# Patient Record
Sex: FEMALE | Race: BLACK OR AFRICAN AMERICAN | NOT HISPANIC OR LATINO | Employment: FULL TIME | URBAN - METROPOLITAN AREA
[De-identification: names, ages, dates, MRNs, and addresses within clinical notes are randomized per-mention and may not be internally consistent; named-entity substitution may affect disease eponyms.]

---

## 2017-06-04 ENCOUNTER — HOSPITAL ENCOUNTER (EMERGENCY)
Facility: HOSPITAL | Age: 52
Discharge: HOME | End: 2017-06-04
Payer: MEDICAID

## 2017-06-04 VITALS
BODY MASS INDEX: 37.22 KG/M2 | WEIGHT: 218 LBS | SYSTOLIC BLOOD PRESSURE: 186 MMHG | OXYGEN SATURATION: 100 % | TEMPERATURE: 98 F | HEART RATE: 77 BPM | RESPIRATION RATE: 18 BRPM | DIASTOLIC BLOOD PRESSURE: 100 MMHG | HEIGHT: 64 IN

## 2017-06-04 DIAGNOSIS — Z51.89 ABSCESS RE-CHECK: Primary | ICD-10-CM

## 2017-06-04 PROCEDURE — 99283 EMERGENCY DEPT VISIT LOW MDM: CPT

## 2017-06-04 RX ORDER — SULFAMETHOXAZOLE AND TRIMETHOPRIM 800; 160 MG/1; MG/1
1 TABLET ORAL 2 TIMES DAILY
Qty: 20 TABLET | Refills: 0 | Status: SHIPPED | OUTPATIENT
Start: 2017-06-04 | End: 2017-06-14

## 2017-06-04 RX ORDER — DICLOFENAC SODIUM 75 MG/1
75 TABLET, DELAYED RELEASE ORAL 2 TIMES DAILY
Qty: 20 TABLET | Refills: 0 | Status: SHIPPED | OUTPATIENT
Start: 2017-06-04

## 2017-06-04 NOTE — ED PROVIDER NOTES
Encounter Date: 6/4/2017       History     Chief Complaint   Patient presents with    Abscess     R buttocks, drained two weeks ago urgent care     Review of patient's allergies indicates:  No Known Allergies  The history is provided by the patient.   Wound Check    She was treated in the ED 10 to 14 days ago. Previous treatment in the ED includes oral antibiotics and I&D of abscess. Treatments since wound repair include oral antibiotics, a wound recheck and regular peroxide and water cleansings. There has been colored discharge from the wound. The redness has improved. The swelling has improved. The pain has improved.     History reviewed. No pertinent past medical history.  No past surgical history on file.  History reviewed. No pertinent family history.  Social History   Substance Use Topics    Smoking status: Never Smoker    Smokeless tobacco: Not on file    Alcohol use No     Review of Systems   Constitutional: Negative for chills and fever.   HENT: Negative for sore throat.    Eyes: Negative for photophobia and redness.   Respiratory: Negative for cough and shortness of breath.    Cardiovascular: Negative for chest pain.   Gastrointestinal: Negative for abdominal pain, diarrhea and nausea.   Endocrine: Negative for polydipsia and polyphagia.   Genitourinary: Negative for dysuria.   Musculoskeletal: Negative for arthralgias, back pain and myalgias.   Skin: Positive for wound. Negative for rash.   Neurological: Negative for weakness and headaches.   Hematological: Does not bruise/bleed easily.   Psychiatric/Behavioral: The patient is not nervous/anxious.    All other systems reviewed and are negative.      Physical Exam     Initial Vitals [06/04/17 1137]   BP Pulse Resp Temp SpO2   (!) 203/90 78 18 98 °F (36.7 °C) 98 %     Physical Exam    Nursing note and vitals reviewed.  Constitutional: Vital signs are normal. She appears well-developed and well-nourished. No distress.   HENT:   Head: Normocephalic and  atraumatic.   Right Ear: External ear normal.   Left Ear: External ear normal.   Nose: Nose normal.   Mouth/Throat: Oropharynx is clear and moist.   Eyes: Conjunctivae, EOM and lids are normal. Pupils are equal, round, and reactive to light.   Neck: Normal range of motion and full passive range of motion without pain. Neck supple.   Cardiovascular: Normal rate, regular rhythm, S1 normal, S2 normal, normal heart sounds, intact distal pulses and normal pulses.   Pulmonary/Chest: Breath sounds normal. No respiratory distress. She has no wheezes. She has no rales.   Abdominal: Soft. Normal appearance and bowel sounds are normal. She exhibits no distension. There is no tenderness.   Musculoskeletal: Normal range of motion.   Lymphadenopathy:     She has no cervical adenopathy.   Neurological: She is alert and oriented to person, place, and time. She has normal strength. No cranial nerve deficit or sensory deficit. Coordination and gait normal.   Skin: Skin is warm, dry and intact.   Healing abscess to right buttock;  No erythema, swelling, induration or fluctuance noted;  Minimal purulent drainage noted from incision site.   Psychiatric: She has a normal mood and affect. Her speech is normal and behavior is normal. Judgment and thought content normal. Cognition and memory are normal.         ED Course   Procedures  Labs Reviewed - No data to display          Medical Decision Making:   ED Management:  Packing removed from right buttock abscess and repacked with 1 in iodoform gauze                   ED Course     Clinical Impression:   The encounter diagnosis was Abscess re-check.    Disposition:   Disposition: Discharged  Condition: Stable       LAYA Plunkett  06/04/17 1155       LAYA Plunkett  06/04/17 1156

## 2017-06-06 ENCOUNTER — HOSPITAL ENCOUNTER (EMERGENCY)
Facility: HOSPITAL | Age: 52
Discharge: HOME | End: 2017-06-06
Payer: MEDICAID

## 2017-06-06 VITALS
RESPIRATION RATE: 18 BRPM | HEART RATE: 90 BPM | OXYGEN SATURATION: 100 % | DIASTOLIC BLOOD PRESSURE: 82 MMHG | HEIGHT: 64 IN | BODY MASS INDEX: 37.22 KG/M2 | WEIGHT: 218 LBS | TEMPERATURE: 97 F | SYSTOLIC BLOOD PRESSURE: 163 MMHG

## 2017-06-06 DIAGNOSIS — Z51.89 WOUND CHECK, ABSCESS: Primary | ICD-10-CM

## 2017-06-06 PROCEDURE — 99281 EMR DPT VST MAYX REQ PHY/QHP: CPT

## 2017-06-06 NOTE — ED PROVIDER NOTES
"SCRIBE #1 NOTE: I, Bobbi Mackey, am scribing for, and in the presence of, LAYA Garcia. I have scribed the entire note.      History      Chief Complaint   Patient presents with    Wound Check     " need the packing removed"       Review of patient's allergies indicates:  No Known Allergies     HPI   HPI    6/6/2017, 3:11 PM   History obtained from the patient      History of Present Illness: Shi Roe is a 51 y.o. female patient with I&D of R buttock abscess who presents to the Emergency Department for packing removal from the wound. She presented here 2 days ago, had packing removed, and then had wound repacked. She states that she is here today to have the packing removed. She has no additional complaints. No exacerbating factors reported. Patient denies fever, chills, N/V/D, worsening pain/drainage from wound, and all other sxs at this time. No further complaints or concerns at this time.     Arrival mode: Personal vehicle    PCP: Primary Doctor No       Past Medical History:  Past Medical History:   Diagnosis Date    Hypertension        Past Surgical History:  Past Surgical History:   Procedure Laterality Date    TUBAL LIGATION           Family History:  Family History   Problem Relation Age of Onset    Kidney disease Mother     Heart disease Father     Cancer Sister     Multiple sclerosis Sister        Social History:  Social History     Social History Main Topics    Smoking status: Current Every Day Smoker     Packs/day: 0.50    Smokeless tobacco: Unknown    Alcohol use No    Drug use: No    Sexual activity: Unknown       ROS   Review of Systems   Constitutional: Negative for chills and fever.   HENT: Negative for sore throat.    Respiratory: Negative for shortness of breath.    Cardiovascular: Negative for chest pain.   Gastrointestinal: Negative for diarrhea, nausea and vomiting.   Genitourinary: Negative for dysuria.   Musculoskeletal: Negative for back pain.   Skin: Positive for " "wound (abscess). Negative for rash.   Neurological: Negative for weakness.   Hematological: Does not bruise/bleed easily.   All other systems reviewed and are negative.      Physical Exam      Initial Vitals [06/06/17 1341]   BP Pulse Resp Temp SpO2   (!) 163/82 90 18 97.4 °F (36.3 °C) 100 %      Physical Exam  Nursing Notes and Vital Signs Reviewed.  Constitutional: Patient is in no acute distress. Awake and alert. Well-developed and well-nourished.  Head: Atraumatic. Normocephalic.  Eyes: PERRL. EOM intact. Conjunctivae are not pale. No scleral icterus.  ENT: Mucous membranes are moist. Oropharynx is clear and symmetric.    Neck: Supple. Full ROM. No lymphadenopathy.  Cardiovascular: Regular rate. Regular rhythm. No murmurs, rubs, or gallops.   Pulmonary/Chest: No respiratory distress. Clear to auscultation bilaterally. No wheezing, rales, or rhonchi.  Abdominal: Non-distended. Soft.  Musculoskeletal: Moves all extremities. No obvious deformities.  Skin: Warm and dry. Packing and bandage removed from wound to R buttock. No pus or drainage visible. Minimal drainage on the bandage.   Neurological:  Alert, awake, and appropriate.  Normal speech.  No acute focal neurological deficits are appreciated.  Psychiatric: Normal affect. Good eye contact. Appropriate in content.    ED Course    Procedures  ED Vital Signs:  Vitals:    06/06/17 1341   BP: (!) 163/82   Pulse: 90   Resp: 18   Temp: 97.4 °F (36.3 °C)   TempSrc: Tympanic   SpO2: 100%   Weight: 98.9 kg (218 lb)   Height: 5' 4" (1.626 m)     The Emergency Provider reviewed the vital signs and test results, which are outlined above.    ED Discussion     3:12 PM: Reassessed pt at this time. Pt's condition has improved. Discussed pt dx and plan of tx.  Recommend patient complete course of antibiotic.  Informed pt to follow up with PCP. All questions and concerns were addressed at this time. Pt expresses understanding of information and instructions, and is comfortable " with plan to discharge. Pt is stable for discharge.    I discussed wound care precautions with patient and/or family/caretaker; specifically that all wounds have risk of infection despite efforts to cleanse and debride the wound; and there is a risk of an occult foreign body (and thus increased risk of infection) despite a negative examination.  I discussed with patient need to return for any signs of infection, specifically redness, increased pain, fever, drainage of pus, or any concern, immediately.    Pre-hypertension/Hypertension: The pt has been informed that they may have pre-hypertension or hypertension based on a blood pressure reading in the ED. I recommend that the pt call the PCP listed on their discharge instructions or a physician of their choice this week to arrange f/u for further evaluation of possible pre-hypertension or hypertension.       ED Medication(s):  Medications - No data to display    Discharge Medication List as of 6/6/2017  3:03 PM          Follow-up Information     [facility] in 2 days.    Specialty:  Urgent Care  Contact information:  [address] [facility] [address] [phone]                    Medical Decision Making              Scribe Attestation:   Scribe #1: I performed the above scribed service and the documentation accurately describes the services I performed. I attest to the accuracy of the note.    Attending:   Physician Attestation Statement for Scribe #1: I, LAYA Garcia, personally performed the services described in this documentation, as scribed by Bobbi Mackey, in my presence, and it is both accurate and complete.          Clinical Impression       ICD-10-CM ICD-9-CM   1. Wound check, abscess Z51.89 V58.89       Disposition:   Disposition: Discharged  Condition: Stable         Lainey Porras PA-C  06/06/17 2139

## 2017-07-07 ENCOUNTER — HOSPITAL ENCOUNTER (EMERGENCY)
Facility: HOSPITAL | Age: 52
Discharge: HOME | End: 2017-07-07
Attending: EMERGENCY MEDICINE
Payer: MEDICAID

## 2017-07-07 VITALS
BODY MASS INDEX: 37.22 KG/M2 | RESPIRATION RATE: 20 BRPM | WEIGHT: 218 LBS | OXYGEN SATURATION: 95 % | HEART RATE: 83 BPM | TEMPERATURE: 98 F | SYSTOLIC BLOOD PRESSURE: 179 MMHG | DIASTOLIC BLOOD PRESSURE: 98 MMHG | HEIGHT: 64 IN
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
## 2017-07-07 DIAGNOSIS — M54.42 ACUTE LEFT-SIDED LOW BACK PAIN WITH LEFT-SIDED SCIATICA: ICD-10-CM

## 2017-07-07 DIAGNOSIS — S16.1XXA NECK STRAIN, INITIAL ENCOUNTER: ICD-10-CM

## 2017-07-07 DIAGNOSIS — R03.0 ELEVATED BP WITHOUT DIAGNOSIS OF HYPERTENSION: ICD-10-CM

## 2017-07-07 PROCEDURE — 63600175 PHARM REV CODE 636 W HCPCS: Performed by: NURSE PRACTITIONER

## 2017-07-07 PROCEDURE — 96372 THER/PROPH/DIAG INJ SC/IM: CPT

## 2017-07-07 PROCEDURE — 25000003 PHARM REV CODE 250: Performed by: NURSE PRACTITIONER

## 2017-07-07 PROCEDURE — 99284 EMERGENCY DEPT VISIT MOD MDM: CPT | Mod: 25

## 2017-07-07 RX ORDER — CLONIDINE HYDROCHLORIDE 0.2 MG/1
0.2 TABLET ORAL
Status: COMPLETED | OUTPATIENT
Start: 2017-07-07 | End: 2017-07-07

## 2017-07-07 RX ORDER — ETODOLAC 400 MG/1
400 TABLET, FILM COATED ORAL 2 TIMES DAILY
Qty: 20 TABLET | Refills: 0 | Status: SHIPPED | OUTPATIENT
Start: 2017-07-07

## 2017-07-07 RX ORDER — CYCLOBENZAPRINE HCL 10 MG
10 TABLET ORAL 3 TIMES DAILY PRN
Qty: 15 TABLET | Refills: 0 | Status: SHIPPED | OUTPATIENT
Start: 2017-07-07 | End: 2017-07-12

## 2017-07-07 RX ORDER — KETOROLAC TROMETHAMINE 30 MG/ML
60 INJECTION, SOLUTION INTRAMUSCULAR; INTRAVENOUS
Status: COMPLETED | OUTPATIENT
Start: 2017-07-07 | End: 2017-07-07

## 2017-07-07 RX ORDER — DEXAMETHASONE 4 MG/1
4 TABLET ORAL DAILY
Qty: 5 TABLET | Refills: 0 | Status: SHIPPED | OUTPATIENT
Start: 2017-07-07 | End: 2017-07-12

## 2017-07-07 RX ORDER — OXYCODONE AND ACETAMINOPHEN 10; 325 MG/1; MG/1
1 TABLET ORAL
Status: COMPLETED | OUTPATIENT
Start: 2017-07-07 | End: 2017-07-07

## 2017-07-07 RX ORDER — ACETAMINOPHEN AND CODEINE PHOSPHATE 300; 30 MG/1; MG/1
1 TABLET ORAL 4 TIMES DAILY PRN
Qty: 15 TABLET | Refills: 0 | Status: SHIPPED | OUTPATIENT
Start: 2017-07-07 | End: 2017-07-17

## 2017-07-07 RX ORDER — ORPHENADRINE CITRATE 30 MG/ML
60 INJECTION INTRAMUSCULAR; INTRAVENOUS
Status: COMPLETED | OUTPATIENT
Start: 2017-07-07 | End: 2017-07-07

## 2017-07-07 RX ADMIN — KETOROLAC TROMETHAMINE 60 MG: 60 INJECTION, SOLUTION INTRAMUSCULAR at 08:07

## 2017-07-07 RX ADMIN — CLONIDINE HYDROCHLORIDE 0.2 MG: 0.2 TABLET ORAL at 09:07

## 2017-07-07 RX ADMIN — OXYCODONE HYDROCHLORIDE AND ACETAMINOPHEN 1 TABLET: 10; 325 TABLET ORAL at 09:07

## 2017-07-07 RX ADMIN — ORPHENADRINE CITRATE 60 MG: 30 INJECTION INTRAMUSCULAR; INTRAVENOUS at 08:07
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
## 2017-07-08 NOTE — ED NOTES
Pt reports a significant decrease in pain. NP notified of BP. NP states to continue monitoring pt at this time. NAD noted. RR e/u, airway open and patent. AAO x 3. POC discussed. Will continue to monitor.

## 2017-08-28 ENCOUNTER — HOSPITAL ENCOUNTER (EMERGENCY)
Facility: HOSPITAL | Age: 52
Discharge: HOME | End: 2017-08-28
Attending: EMERGENCY MEDICINE
Payer: MEDICAID

## 2017-08-28 VITALS
DIASTOLIC BLOOD PRESSURE: 107 MMHG | OXYGEN SATURATION: 99 % | BODY MASS INDEX: 35.85 KG/M2 | HEART RATE: 80 BPM | SYSTOLIC BLOOD PRESSURE: 203 MMHG | RESPIRATION RATE: 18 BRPM | WEIGHT: 210 LBS | HEIGHT: 64 IN | TEMPERATURE: 98 F

## 2017-08-28 DIAGNOSIS — I10 ESSENTIAL HYPERTENSION: ICD-10-CM

## 2017-08-28 DIAGNOSIS — T78.40XA ALLERGIC REACTION, INITIAL ENCOUNTER: Primary | ICD-10-CM

## 2017-08-28 PROCEDURE — 99283 EMERGENCY DEPT VISIT LOW MDM: CPT

## 2017-08-28 RX ORDER — PREDNISONE 50 MG/1
50 TABLET ORAL DAILY
Qty: 5 TABLET | Refills: 0 | Status: SHIPPED | OUTPATIENT
Start: 2017-08-28 | End: 2017-09-02

## 2017-08-28 RX ORDER — AMLODIPINE BESYLATE 10 MG/1
10 TABLET ORAL DAILY
Qty: 30 TABLET | Refills: 0 | Status: SHIPPED | OUTPATIENT
Start: 2017-08-28 | End: 2019-02-18

## 2017-08-28 NOTE — ED PROVIDER NOTES
SCRIBE #1 NOTE: I, José Miguel Bauman, am scribing for, and in the presence of, Gato Rosenthal MD. I have scribed the entire note.      History      Chief Complaint   Patient presents with    Rash     pt used a new hair dye and now reports a rash and itching       Review of patient's allergies indicates:  No Known Allergies     HPI   HPI    8/28/2017, 12:31 PM   History obtained from the patient      History of Present Illness: Shi Doe is a 51 y.o. female patient who presents to the Emergency Department for a rash to her scalp and torso which onset gradually a few days ago. Symptoms are constant and moderate in severity. Pt reports the rash is itchy and use a new hair dye prior to the rash appearing. No mitigating or exacerbating factors reported. There are no associated sxs at this time. Patient denies any fever, chills, n/v/d, weakness, numbness, CP, SOB, abd pain, HA, dizziness, and all other sxs at this time. No further complaints or concerns at this time.      Arrival mode: Personal vehicle     PCP: Not given       Past Medical History:  Past Medical History:   Diagnosis Date    Hypertension        Past Surgical History:  Past Surgical History:   Procedure Laterality Date    TUBAL LIGATION           Family History:  Family History   Problem Relation Age of Onset    Kidney disease Mother     Heart disease Father     Cancer Sister     Multiple sclerosis Sister        Social History:  Social History     Social History Main Topics    Smoking status: Current Every Day Smoker     Packs/day: 0.50    Smokeless tobacco: Never Used    Alcohol use No    Drug use: No    Sexual activity: Not given       ROS   Review of Systems   Constitutional: Negative for chills and fever.   HENT: Negative for sore throat.    Respiratory: Negative for shortness of breath.    Cardiovascular: Negative for chest pain.   Gastrointestinal: Negative for abdominal pain, diarrhea, nausea and vomiting.  "  Genitourinary: Negative for dysuria.   Musculoskeletal: Negative for back pain.   Skin: Positive for rash (scalp and torso).   Neurological: Negative for dizziness, weakness, numbness and headaches.   Hematological: Does not bruise/bleed easily.   All other systems reviewed and are negative.      Physical Exam      Initial Vitals [08/28/17 1228]   BP Pulse Resp Temp SpO2   (!) 203/107 80 18 97.9 °F (36.6 °C) 99 %      MAP       139          Physical Exam  Nursing Notes and Vital Signs Reviewed.  Constitutional: Patient is in no acute distress. Well-developed and well-nourished.  Head: Atraumatic. Normocephalic.  Eyes: PERRL. EOM intact. Conjunctivae are not pale. No scleral icterus.  ENT: Mucous membranes are moist.    Neck: Supple. Full ROM. No lymphadenopathy.  Cardiovascular: Regular rate. Regular rhythm. No murmurs, rubs, or gallops.   Pulmonary/Chest: No respiratory distress. Clear to auscultation bilaterally. No wheezing, rales, or rhonchi.  Abdominal: Soft and non-distended.    Musculoskeletal: Moves all extremities. No obvious deformities. No edema.   Skin: Warm and dry. Raised urticaria to scalp and torso.  Neurological:  Alert, awake, and appropriate.  Normal speech.  No acute focal neurological deficits are appreciated.  Psychiatric: Normal affect. Good eye contact. Appropriate in content.    ED Course    Procedures  ED Vital Signs:  Vitals:    08/28/17 1228   BP: (!) 203/107   Pulse: 80   Resp: 18   Temp: 97.9 °F (36.6 °C)   TempSrc: Oral   SpO2: 99%   Weight: 95.3 kg (210 lb)   Height: 5' 4" (1.626 m)          The Emergency Provider reviewed the vital signs and test results, which are outlined above.    ED Discussion     12:36 PM: Initial assessment of pt at this time.  Discussed with pt all pertinent ED information. Discussed pt dx and plan of tx. Gave pt all f/u and return to the ED instructions. All questions and concerns were addressed at this time. Pt expresses understanding of information and " instructions, and is comfortable with plan to discharge. Pt is stable for discharge.    I discussed with patient and/or family/caretaker that evaluation in the ED does not suggest any emergent or life threatening medical conditions requiring immediate intervention beyond what was provided in the ED, and I believe patient is safe for discharge.  Regardless, an unremarkable evaluation in the ED does not preclude the development or presence of a serious of life threatening condition. As such, patient was instructed to return immediately for any worsening or change in current symptoms.    Pre-hypertension/Hypertension: The pt has been informed that they may have pre-hypertension or hypertension based on a blood pressure reading in the ED. I recommend that the pt call the PCP listed on their discharge instructions or a physician of their choice this week to arrange f/u for further evaluation of possible pre-hypertension or hypertension.       Discharge Medication List as of 8/28/2017 12:36 PM      START taking these medications    Details   amlodipine (NORVASC) 10 MG tablet Take 1 tablet (10 mg total) by mouth once daily., Starting Mon 8/28/2017, Until Tue 8/28/2018, Print      predniSONE (DELTASONE) 50 MG Tab Take 1 tablet (50 mg total) by mouth once daily., Starting Mon 8/28/2017, Until Sat 9/2/2017, Print             Follow-up Information     [facility] in 2 days.    Contact information:  [address] [facility] [place] [address] [phone]                     Medical Decision Making              Scribe Attestation:   Scribe #1: I performed the above scribed service and the documentation accurately describes the services I performed. I attest to the accuracy of the note.    Attending:   Physician Attestation Statement for Scribe #1: I, Gato Rosenthal MD, personally performed the services described in this documentation, as scribed by José Miguel Bauman, in my presence, and it is both accurate and  complete.          Clinical Impression       ICD-10-CM ICD-9-CM   1. Allergic reaction, initial encounter T78.40XA 995.3   2. Essential hypertension I10 401.9       Disposition:   Disposition: Discharged  Condition: Stable         Gato Rosenthal MD  08/28/17 1470

## 2018-05-28 ENCOUNTER — HOSPITAL ENCOUNTER (EMERGENCY)
Facility: HOSPITAL | Age: 53
Discharge: HOME | End: 2018-05-29
Attending: EMERGENCY MEDICINE
Payer: MEDICAID

## 2018-05-28 DIAGNOSIS — R07.9 CHEST PAIN, UNSPECIFIED TYPE: Primary | ICD-10-CM

## 2018-05-28 DIAGNOSIS — R10.9 ABDOMINAL PAIN: ICD-10-CM

## 2018-05-28 DIAGNOSIS — I16.0 HYPERTENSIVE URGENCY: ICD-10-CM

## 2018-05-28 DIAGNOSIS — R05.9 COUGH: ICD-10-CM

## 2018-05-28 LAB
ALBUMIN SERPL BCP-MCNC: 4.2 G/DL
ALP SERPL-CCNC: 90 U/L
ALT SERPL W/O P-5'-P-CCNC: 21 U/L
ANION GAP SERPL CALC-SCNC: 15 MMOL/L
AST SERPL-CCNC: 25 U/L
BASOPHILS # BLD AUTO: 0.02 K/UL
BASOPHILS NFR BLD: 0.3 %
BILIRUB SERPL-MCNC: 0.4 MG/DL
BNP SERPL-MCNC: <10 PG/ML
BUN SERPL-MCNC: 8 MG/DL
CALCIUM SERPL-MCNC: 9.9 MG/DL
CHLORIDE SERPL-SCNC: 99 MMOL/L
CO2 SERPL-SCNC: 21 MMOL/L
CREAT SERPL-MCNC: 0.9 MG/DL
DIFFERENTIAL METHOD: ABNORMAL
EOSINOPHIL # BLD AUTO: 0 K/UL
EOSINOPHIL NFR BLD: 0.1 %
ERYTHROCYTE [DISTWIDTH] IN BLOOD BY AUTOMATED COUNT: 15.7 %
EST. GFR  (AFRICAN AMERICAN): >60 ML/MIN/1.73 M^2
EST. GFR  (NON AFRICAN AMERICAN): >60 ML/MIN/1.73 M^2
GLUCOSE SERPL-MCNC: 130 MG/DL
HCT VFR BLD AUTO: 46.3 %
HGB BLD-MCNC: 17 G/DL
LIPASE SERPL-CCNC: 8 U/L
LYMPHOCYTES # BLD AUTO: 1.8 K/UL
LYMPHOCYTES NFR BLD: 24.9 %
MCH RBC QN AUTO: 29 PG
MCHC RBC AUTO-ENTMCNC: 36.7 G/DL
MCV RBC AUTO: 79 FL
MONOCYTES # BLD AUTO: 0.3 K/UL
MONOCYTES NFR BLD: 3.8 %
NEUTROPHILS # BLD AUTO: 5 K/UL
NEUTROPHILS NFR BLD: 71.3 %
PLATELET # BLD AUTO: 199 K/UL
PMV BLD AUTO: ABNORMAL FL
POTASSIUM SERPL-SCNC: 4.9 MMOL/L
PROT SERPL-MCNC: 9 G/DL
RBC # BLD AUTO: 5.87 M/UL
SODIUM SERPL-SCNC: 135 MMOL/L
TROPONIN I SERPL DL<=0.01 NG/ML-MCNC: <0.006 NG/ML
WBC # BLD AUTO: 7.06 K/UL

## 2018-05-28 PROCEDURE — 83880 ASSAY OF NATRIURETIC PEPTIDE: CPT

## 2018-05-28 PROCEDURE — 25000003 PHARM REV CODE 250: Performed by: NURSE PRACTITIONER

## 2018-05-28 PROCEDURE — 96375 TX/PRO/DX INJ NEW DRUG ADDON: CPT | Mod: 59

## 2018-05-28 PROCEDURE — 96376 TX/PRO/DX INJ SAME DRUG ADON: CPT

## 2018-05-28 PROCEDURE — 80053 COMPREHEN METABOLIC PANEL: CPT

## 2018-05-28 PROCEDURE — 96365 THER/PROPH/DIAG IV INF INIT: CPT

## 2018-05-28 PROCEDURE — 84484 ASSAY OF TROPONIN QUANT: CPT | Mod: 91

## 2018-05-28 PROCEDURE — 96361 HYDRATE IV INFUSION ADD-ON: CPT

## 2018-05-28 PROCEDURE — 25500020 PHARM REV CODE 255: Performed by: EMERGENCY MEDICINE

## 2018-05-28 PROCEDURE — 63600175 PHARM REV CODE 636 W HCPCS: Performed by: NURSE PRACTITIONER

## 2018-05-28 PROCEDURE — 25000003 PHARM REV CODE 250: Performed by: EMERGENCY MEDICINE

## 2018-05-28 PROCEDURE — 93010 ELECTROCARDIOGRAM REPORT: CPT | Mod: ,,, | Performed by: INTERNAL MEDICINE

## 2018-05-28 PROCEDURE — 93005 ELECTROCARDIOGRAM TRACING: CPT

## 2018-05-28 PROCEDURE — 99285 EMERGENCY DEPT VISIT HI MDM: CPT | Mod: 25

## 2018-05-28 PROCEDURE — 83690 ASSAY OF LIPASE: CPT

## 2018-05-28 PROCEDURE — 85025 COMPLETE CBC W/AUTO DIFF WBC: CPT

## 2018-05-28 PROCEDURE — 63600175 PHARM REV CODE 636 W HCPCS: Performed by: EMERGENCY MEDICINE

## 2018-05-28 PROCEDURE — 84484 ASSAY OF TROPONIN QUANT: CPT

## 2018-05-28 RX ORDER — HYDRALAZINE HYDROCHLORIDE 20 MG/ML
20 INJECTION INTRAMUSCULAR; INTRAVENOUS
Status: COMPLETED | OUTPATIENT
Start: 2018-05-28 | End: 2018-05-28

## 2018-05-28 RX ORDER — LABETALOL HYDROCHLORIDE 5 MG/ML
20 INJECTION, SOLUTION INTRAVENOUS ONCE
Status: COMPLETED | OUTPATIENT
Start: 2018-05-28 | End: 2018-05-28

## 2018-05-28 RX ORDER — MORPHINE SULFATE 10 MG/ML
4 INJECTION INTRAMUSCULAR; INTRAVENOUS; SUBCUTANEOUS
Status: COMPLETED | OUTPATIENT
Start: 2018-05-28 | End: 2018-05-28

## 2018-05-28 RX ORDER — NICARDIPINE HYDROCHLORIDE 0.2 MG/ML
5 INJECTION INTRAVENOUS CONTINUOUS
Status: DISCONTINUED | OUTPATIENT
Start: 2018-05-28 | End: 2018-05-29 | Stop reason: HOSPADM

## 2018-05-28 RX ORDER — ONDANSETRON 2 MG/ML
4 INJECTION INTRAMUSCULAR; INTRAVENOUS
Status: COMPLETED | OUTPATIENT
Start: 2018-05-28 | End: 2018-05-28

## 2018-05-28 RX ORDER — HYDROMORPHONE HYDROCHLORIDE 2 MG/ML
1 INJECTION, SOLUTION INTRAMUSCULAR; INTRAVENOUS; SUBCUTANEOUS
Status: COMPLETED | OUTPATIENT
Start: 2018-05-28 | End: 2018-05-28

## 2018-05-28 RX ADMIN — PROMETHAZINE HYDROCHLORIDE 25 MG: 25 INJECTION INTRAMUSCULAR; INTRAVENOUS at 07:05

## 2018-05-28 RX ADMIN — ONDANSETRON 4 MG: 2 INJECTION INTRAMUSCULAR; INTRAVENOUS at 10:05

## 2018-05-28 RX ADMIN — IOHEXOL 100 ML: 350 INJECTION, SOLUTION INTRAVENOUS at 10:05

## 2018-05-28 RX ADMIN — SODIUM CHLORIDE 1000 ML: 0.9 INJECTION, SOLUTION INTRAVENOUS at 11:05

## 2018-05-28 RX ADMIN — HYDROMORPHONE HYDROCHLORIDE 1 MG: 2 INJECTION INTRAMUSCULAR; INTRAVENOUS; SUBCUTANEOUS at 10:05

## 2018-05-28 RX ADMIN — MORPHINE SULFATE 4 MG: 10 INJECTION INTRAVENOUS at 07:05

## 2018-05-28 RX ADMIN — LABETALOL HYDROCHLORIDE 20 MG: 5 INJECTION, SOLUTION INTRAVENOUS at 10:05

## 2018-05-28 RX ADMIN — HYDRALAZINE HYDROCHLORIDE 20 MG: 20 INJECTION INTRAMUSCULAR; INTRAVENOUS at 07:05

## 2018-05-28 NOTE — ED PROVIDER NOTES
"SCRIBE #1 NOTE: I, Kelvin Zuleta, am scribing for, and in the presence of, Shelby Sawyer NP. I have scribed the HPI, ROS, and PEx.     SCRIBE #2 NOTE: I, Faith House, am scribing for, and in the presence of,  Maynor Maria Do, MD . I have scribed the remaining portions of the note not scribed by Scribe #1.     History      Chief Complaint   Patient presents with    Abdominal Pain     c/o burning pain to epigastric area and middle of chest, patient states "my stomach is sore from chronic coughing, that coughing has got me so bad, I can't even cough anymore. And I keep catching hot flashes, its not then its cold. Just give me something to make me throw up"       Review of patient's allergies indicates:  No Known Allergies     HPI   HPI    5/28/2018, 6:13 PM   History obtained from the patient      History of Present Illness: Molly Doe is a 52 y.o. female patient with a hx of HTN who presents to the Emergency Department for an evaluation of upper abdominal pain which onset gradually a few days ago. Pt reports her sx began after she began to cough a few days ago. Pt reports since she has been sore, feeling it primarily in her abdomen. Pt reports she can tolerate PO. Pt states she is having regular normal BMs but is saying "the core needs to come out." Symptoms are constipation and nothing in severity. Exacerbated by coughing and relieved by nothing. Associated sxs include productive coughing, nausea, subjective fever, CP, and rhinorrhea. Patient denies any fever, chills, diarrhea, emesis, constipation, dysuria, hematuria, HA, weakness, trouble swallowing, vaginal bleeding/discharge, SOB, leg swelling, diaphoresis, and all other sxs at this time. Pt denies tx PTA. No further complaints or concerns at this time.       Arrival mode: Personal vehicle     PCP: Primary Doctor No       Past Medical History:  Past Medical History:   Diagnosis Date    Hypertension        Past Surgical History:  Past Surgical " History:   Procedure Laterality Date    TUBAL LIGATION           Family History:  Family History   Problem Relation Age of Onset    Kidney disease Mother     Heart disease Father     Cancer Sister     Multiple sclerosis Sister        Social History:  Social History     Social History Main Topics    Smoking status: Current Every Day Smoker     Packs/day: 0.50    Smokeless tobacco: Never Used    Alcohol use No    Drug use: No    Sexual activity: Unknown       ROS   Review of Systems   Constitutional: Negative for appetite change, chills, diaphoresis and fever.        (-) Recent travel   HENT: Positive for rhinorrhea. Negative for congestion, sore throat and trouble swallowing.    Respiratory: Positive for cough. Negative for chest tightness, shortness of breath and wheezing.    Cardiovascular: Positive for chest pain. Negative for palpitations and leg swelling.   Gastrointestinal: Positive for abdominal pain (Upper) and nausea. Negative for abdominal distention, blood in stool, constipation, diarrhea and vomiting.   Genitourinary: Negative for decreased urine volume, dysuria, frequency, hematuria, urgency, vaginal bleeding and vaginal discharge.   Musculoskeletal: Negative for back pain and neck pain.   Skin: Negative for rash.   Neurological: Negative for dizziness, weakness, light-headedness and headaches.   Hematological: Does not bruise/bleed easily.     Physical Exam      Initial Vitals [05/28/18 1752]   BP Pulse Resp Temp SpO2   (!) 224/116 92 20 97.6 °F (36.4 °C) 100 %      MAP       152          Physical Exam  Nursing Notes and Vital Signs Reviewed.  Constitutional: Patient is in no acute distress.   Head: Atraumatic. Normocephalic.  Eyes: PERRL. EOM intact. Conjunctivae are not pale. No scleral icterus.  ENT: Mucous membranes are moist. Oropharynx is clear and symmetric.    Neck: Supple. Full ROM. No lymphadenopathy.  Cardiovascular: Regular rate. Regular rhythm.  Pulmonary/Chest: No respiratory  distress. Clear to auscultation bilaterally. No wheezing or rales.  Abdominal: Soft and non-distended.  Epigastric tenderness.  No rebound, guarding, or rigidity. Good bowel sounds.  Genitourinary: No CVA tenderness  Musculoskeletal: Moves all extremities. No obvious deformities. No edema. No calf tenderness.  Skin: Warm and dry.  Neurological:  Alert, awake, and appropriate.  Normal speech.  No acute focal neurological deficits are appreciated.  Psychiatric: Normal affect. Good eye contact. Appropriate in content.    ED Course    Critical Care  Date/Time: 5/29/2018 12:24 AM  Performed by: MARTELL MORRIS  Authorized by: MARTELL MORRIS   Direct patient critical care time: 7 minutes  Additional history critical care time: 7 minutes  Ordering / reviewing critical care time: 7 minutes  Documentation critical care time: 7 minutes  Consulting other physicians critical care time: 7 minutes  Total critical care time (exclusive of procedural time) : 35 minutes  Critical care time was exclusive of separately billable procedures and treating other patients.  Critical care was necessary to treat or prevent imminent or life-threatening deterioration of the following conditions: hypertensive urgency.  Critical care was time spent personally by me on the following activities: blood draw for specimens, development of treatment plan with patient or surrogate, interpretation of cardiac output measurements, evaluation of patient's response to treatment, examination of patient, obtaining history from patient or surrogate, ordering and performing treatments and interventions, ordering and review of laboratory studies, ordering and review of radiographic studies, pulse oximetry, re-evaluation of patient's condition and review of old charts.        ED Vital Signs:  Vitals:    05/28/18 1752 05/28/18 1847 05/28/18 1930 05/28/18 1936   BP: (!) 224/116  (!) 218/113 (!) 218/102   Pulse: 92 85  87   Resp: 20   15   Temp: 97.6 °F (36.4  "°C)      TempSrc: Oral      SpO2: 100%   98%   Weight: 102.7 kg (226 lb 4.8 oz)      Height: 5' 4" (1.626 m)       05/28/18 1943 05/28/18 2000 05/28/18 2001 05/28/18 2101   BP:  (!) 207/98 (!) 215/99 (!) 182/86   Pulse: 93  95 99   Resp:   20 20   Temp:       TempSrc:       SpO2:   99% 99%   Weight:       Height:        05/28/18 2158 05/28/18 2225 05/28/18 2231 05/29/18 0028   BP: (!) 213/97 134/71 (!) 157/78 (!) 159/76   Pulse: 109 94 93 94   Resp: 20 20 20 20   Temp:    97.7 °F (36.5 °C)   TempSrc:    Oral   SpO2: 98% 97% 98% 100%   Weight:       Height:           Abnormal Lab Results:  Labs Reviewed   CBC W/ AUTO DIFFERENTIAL - Abnormal; Notable for the following:        Result Value    RBC 5.87 (*)     Hemoglobin 17.0 (*)     MCV 79 (*)     MCHC 36.7 (*)     RDW 15.7 (*)     Mono% 3.8 (*)     All other components within normal limits   COMPREHENSIVE METABOLIC PANEL - Abnormal; Notable for the following:     Sodium 135 (*)     CO2 21 (*)     Glucose 130 (*)     Total Protein 9.0 (*)     All other components within normal limits   LIPASE   TROPONIN I   B-TYPE NATRIURETIC PEPTIDE   TROPONIN I        All Lab Results:  Results for orders placed or performed during the hospital encounter of 05/28/18   CBC auto differential   Result Value Ref Range    WBC 7.06 3.90 - 12.70 K/uL    RBC 5.87 (H) 4.00 - 5.40 M/uL    Hemoglobin 17.0 (H) 12.0 - 16.0 g/dL    Hematocrit 46.3 37.0 - 48.5 %    MCV 79 (L) 82 - 98 fL    MCH 29.0 27.0 - 31.0 pg    MCHC 36.7 (H) 32.0 - 36.0 g/dL    RDW 15.7 (H) 11.5 - 14.5 %    Platelets 199 150 - 350 K/uL    MPV SEE COMMENT 9.2 - 12.9 fL    Gran # (ANC) 5.0 1.8 - 7.7 K/uL    Lymph # 1.8 1.0 - 4.8 K/uL    Mono # 0.3 0.3 - 1.0 K/uL    Eos # 0.0 0.0 - 0.5 K/uL    Baso # 0.02 0.00 - 0.20 K/uL    Gran% 71.3 38.0 - 73.0 %    Lymph% 24.9 18.0 - 48.0 %    Mono% 3.8 (L) 4.0 - 15.0 %    Eosinophil% 0.1 0.0 - 8.0 %    Basophil% 0.3 0.0 - 1.9 %    Differential Method Automated    Comprehensive metabolic panel "   Result Value Ref Range    Sodium 135 (L) 136 - 145 mmol/L    Potassium 4.9 3.5 - 5.1 mmol/L    Chloride 99 95 - 110 mmol/L    CO2 21 (L) 23 - 29 mmol/L    Glucose 130 (H) 70 - 110 mg/dL    BUN, Bld 8 6 - 20 mg/dL    Creatinine 0.9 0.5 - 1.4 mg/dL    Calcium 9.9 8.7 - 10.5 mg/dL    Total Protein 9.0 (H) 6.0 - 8.4 g/dL    Albumin 4.2 3.5 - 5.2 g/dL    Total Bilirubin 0.4 0.1 - 1.0 mg/dL    Alkaline Phosphatase 90 55 - 135 U/L    AST 25 10 - 40 U/L    ALT 21 10 - 44 U/L    Anion Gap 15 8 - 16 mmol/L    eGFR if African American >60 >60 mL/min/1.73 m^2    eGFR if non African American >60 >60 mL/min/1.73 m^2   Lipase   Result Value Ref Range    Lipase 8 4 - 60 U/L   Troponin I   Result Value Ref Range    Troponin I <0.006 0.000 - 0.026 ng/mL   B-Type natriuretic peptide (BNP)   Result Value Ref Range    BNP <10 0 - 99 pg/mL   Troponin I   Result Value Ref Range    Troponin I <0.006 0.000 - 0.026 ng/mL       Imaging Results:  Imaging Results          CTA Chest Abdomen Pelvis (Final result)  Result time 05/28/18 23:12:47   Procedure changed from CTA Chest Abdomen Non Coronary     Final result by Enzo Jackson MD (05/28/18 23:12:47)                 Impression:      1. Normal thoracic aorta.  2. No abdominal aortic aneurysm or dissection or pseudo aneurysm or intramural hematoma.  There is some mild marginal calcific plaque of the normal caliber abdominal aorta.  3. No acute cardiopulmonary process.  4. No acute inflammatory changes in the abdomen or pelvis.  5. Calcific atherosclerotic disease of the coronary arteries.  6. Avascular necrosis of the right femoral head.  All CT scans at this facility use dose modulation, iterative reconstruction and/or weight based dosing when appropriate to reduce radiation dose to as low as reasonably achievable.      Electronically signed by: Enzo Jackson MD  Date:    05/28/2018  Time:    23:12             Narrative:    EXAMINATION:  CTA CHEST ABDOMEN PELVIS    CLINICAL HISTORY:  Acute  severe chest/back pain.  Aortic dissection suspected    TECHNIQUE:  The chest, abdomen, and pelvis were scanned during the intravenous administration of 100 cc of Omnipaque 350 utilizing an angiogram protocol.  Axial and 3D MIP images are reviewed.    COMPARISON:  None    FINDINGS:  Chest: There is no thoracic aortic aneurysm or dissection or pseudoaneurysm or intramural hematoma.  No ulcerative plaque.  There is calcific atherosclerotic disease of the coronary arteries.  The heart size is normal.  No pericardial effusion.  No mediastinal or hilar lymphadenopathy.  The lungs are clear.  No pleural effusion or pneumothorax.  Thoracic spondylosis is noted in the thoracic spine.  No fracture of the thoracic osseous structures.    Abdomen/pelvis: There is no abdominal aortic aneurysm or dissection or pseudoaneurysm or intramural hematoma.  There is marginal calcific plaque of the normal caliber infrarenal abdominal aorta.  The superior mesenteric artery, celiac artery, inferior mesenteric artery, and bilateral single renal veins are widely patent.  The bilateral common iliac arteries contain marginal calcific atherosclerotic plaque but are patent.  The bilateral external iliac arteries and bilateral hypogastric arteries are patent.  The bilateral common femoral arteries are patent with patent infrainguinal runoff.  The abdominal solid organs have a normal appearance.  There is a large air-fluid level in the distended stomach.  No small bowel obstruction.  Normal appendix.  No free fluid or inflammatory change or lymphadenopathy.  There are multiple fibroids in the uterus.  The osseous structures show severe bilateral L4-5 facet joint osteoarthritis.  There is avascular necrosis of the right femoral head without femoral head collapse.                               X-Ray Chest PA And Lateral (Final result)  Result time 05/28/18 18:39:57    Final result by Enzo Jackson MD (05/28/18 18:39:57)                 Impression:       Subtle interstitial opacities throughout both lungs.      Electronically signed by: Enzo Jackson MD  Date:    05/28/2018  Time:    18:39             Narrative:    EXAMINATION:  XR CHEST PA AND LATERAL    CLINICAL HISTORY:  Cough    COMPARISON:  None    FINDINGS:  There are subtle interstitial opacities throughout the lungs diffusely.  No yogi consolidation is seen.  The cardiac silhouette is within normal limits. No pleural effusion or pneumothorax. The bones are intact.                               X-Ray Abdomen Flat And Erect (Final result)  Result time 05/28/18 18:39:11    Final result by Enzo Jackson MD (05/28/18 18:39:11)                 Impression:      No evidence for small bowel obstruction.      Electronically signed by: Enzo Jackson MD  Date:    05/28/2018  Time:    18:39             Narrative:    EXAMINATION:  XR ABDOMEN FLAT AND ERECT    CLINICAL HISTORY:  Unspecified abdominal pain    TECHNIQUE:  Flat and erect AP views of the abdomen were performed.    COMPARISON:  None    FINDINGS:  No free air in the peritoneum.  There is severe levoscoliosis of the lumbar spine.  There is a paucity of bowel gas.  No distended dilated small bowel loops are seen.                                      The EKG was ordered, reviewed, and independently interpreted by the ED provider.  Interpretation time: 18:01  Rate: 82 BPM  Rhythm: normal sinus rhythm  Interpretation: Minimal voltage criteria for LVH, may be normal variant. No STEMI.      The Emergency Provider reviewed the vital signs and test results, which are outlined above.    ED Discussion     7:51 PM: Re-evaluated pt. Pt is resting comfortably and is in no acute distress.  D/w pt all pertinent results. D/w pt any concerns expressed at this time. Answered all questions. Pt expresses understanding at this time.    8:40 PM: Maynor Maria Do, MD  transfers care of pt to Dr. Kolb, pending imaging results.    8:41 PM: Dr. Kolb evaluated pt. Patient reports her BP  usually in 150s. Patient states CP has returned at this time. Patient describes CP as radiating to back.     12:25 AM: Reassessed pt at this time.  Pt states her condition has improved at this time. Discussed with pt all pertinent ED information and results. Discussed pt dx and plan of tx. Gave pt all f/u and return to the ED instructions. All questions and concerns were addressed at this time. Pt expresses understanding of information and instructions, and is comfortable with plan to discharge. Pt is stable for discharge.  Patient's pressure has improved. Patient states CP has resolved. Repeat troponin is negative. Will start patient on Metoprolol for elevated HR and patient will continue amlodipine.     I discussed with patient and/or family/caretaker that evaluation in the ED does not suggest any emergent or life threatening medical conditions requiring immediate intervention beyond what was provided in the ED, and I believe patient is safe for discharge.  Regardless, an unremarkable evaluation in the ED does not preclude the development or presence of a serious of life threatening condition. As such, patient was instructed to return immediately for any worsening or change in current symptoms.      ED Medication(s):  Medications   niCARdipine 40 mg/200 mL infusion (0 mg/hr Intravenous Hold 5/28/18 2155)   promethazine (PHENERGAN) 25 mg in dextrose 5 % 50 mL IVPB (0 mg Intravenous Stopped 5/28/18 1954)   hydrALAZINE injection 20 mg (20 mg Intravenous Given 5/28/18 1930)   morphine injection 4 mg (4 mg Intravenous Given 5/28/18 1927)   hydrALAZINE injection 20 mg (20 mg Intravenous Given 5/28/18 1958)   labetalol injection 20 mg (20 mg Intravenous Given 5/28/18 2206)   hydromorphone (PF) injection 1 mg (1 mg Intravenous Given 5/28/18 2202)   ondansetron injection 4 mg (4 mg Intravenous Given 5/28/18 2201)   omnipaque 350 iohexol 100 mL (100 mLs Intravenous Given 5/28/18 2255)   sodium chloride 0.9% bolus 1,000 mL  (0 mLs Intravenous Stopped 5/29/18 0027)       Discharge Medication List as of 5/29/2018 12:26 AM      START taking these medications    Details   metoprolol succinate (TOPROL-XL) 25 MG 24 hr tablet Take 1 tablet (25 mg total) by mouth once daily., Starting Tue 5/29/2018, Until Wed 5/29/2019, Print      promethazine-codeine 6.25-10 mg/5 ml (PHENERGAN WITH CODEINE) 6.25-10 mg/5 mL syrup Take 5 mLs by mouth every 4 (four) hours as needed for Cough., Starting Tue 5/29/2018, Until Fri 6/8/2018, Print             Follow-up Information     [facility] In 2 days.    Contact information:  [address] [address]                   Medical Decision Making    Medical Decision Making:   Clinical Tests:   Lab Tests: Ordered and Reviewed  Radiological Study: Ordered and Reviewed  Medical Tests: Ordered and Reviewed           Scribe Attestation:   Scribe #1: I performed the above scribed service and the documentation accurately describes the services I performed. I attest to the accuracy of the note.    Attending:   Physician Attestation Statement for Scribe #1: I, Shelby Sawyer NP, personally performed the services described in this documentation, as scribed by Kelvin Zuleta, in my presence, and it is both accurate and complete.       Scribe Attestation:   Scribe #2: I performed the above scribed service and the documentation accurately describes the services I performed. I attest to the accuracy of the note.    Attending Attestation:           Physician Attestation for Scribe:    Physician Attestation Statement for Scribe #2: I, Maynor Maria Do, MD , reviewed documentation, as scribed by Faith House in my presence, and it is both accurate and complete. I also acknowledge and confirm the content of the note done by Josselineibe #1.          Clinical Impression       ICD-10-CM ICD-9-CM   1. Chest pain, unspecified type R07.9 786.50   2. Abdominal pain R10.9 789.00   3. Cough R05 786.2   4.  Hypertensive urgency I16.0 401.9       Disposition:   Disposition: Discharged  Condition: Stable         Maynor Maria Do, MD  05/29/18 0227

## 2018-05-28 NOTE — ED PROVIDER NOTES
"SCRIBE #1 NOTE: I, Dina Reid, am scribing for, and in the presence of, No att. providers found. I have scribed the entire note.      History      Chief Complaint   Patient presents with    Abdominal Pain     c/o burning pain to epigastric area and middle of chest, patient states "my stomach is sore from chronic coughing, that coughing has got me so bad, I can't even cough anymore. And I keep catching hot flashes, its not then its cold. Just give me something to make me throw up"       Review of patient's allergies indicates:  No Known Allergies     HPI   HPI    5/28/2018, 6:04 PM   History obtained from the {adult relatives:95352::"patient"}      History of Present Illness: Molly Doe is a 52 y.o. female patient who presents to the Emergency Department for ***      Arrival mode: ***Personal vehicle  EMS  [facility]***    PCP: Primary Doctor No       Past Medical History:  Past Medical History:   Diagnosis Date    Hypertension        Past Surgical History:  Past Surgical History:   Procedure Laterality Date    TUBAL LIGATION           Family History:  Family History   Problem Relation Age of Onset    Kidney disease Mother     Heart disease Father     Cancer Sister     Multiple sclerosis Sister        Social History:  Social History     Social History Main Topics    Smoking status: Current Every Day Smoker     Packs/day: 0.50    Smokeless tobacco: Never Used    Alcohol use No    Drug use: No    Sexual activity: Not on file       ROS   Review of Systems  ***  Physical Exam      Initial Vitals [05/28/18 1752]   BP Pulse Resp Temp SpO2   (!) 224/116 92 20 97.6 °F (36.4 °C) 100 %      MAP       152          Physical Exam  Nursing Notes and Vital Signs Reviewed.  Constitutional: Patient is in {DISTRESS LEVEL:69993}. Well-developed and well-nourished.  Head: Atraumatic. Normocephalic.  Eyes: PERRL. EOM intact. Conjunctivae are not pale. No scleral icterus.  ENT: Mucous membranes are moist. Oropharynx " "is clear and symmetric***.    Neck: Supple. Full ROM. No lymphadenopathy.  Cardiovascular: Regular rate. Regular rhythm***. No murmurs, rubs, or gallops. Distal pulses are 2+ and symmetric***.  Pulmonary/Chest: No respiratory distress. Clear to auscultation bilaterally***. No wheezing or rales.  Abdominal: Soft and non-distended.  There is no tenderness.  No rebound, guarding, or rigidity. Good bowel sounds***.  Genitourinary: No*** CVA tenderness  Musculoskeletal: Moves all extremities. No obvious deformities. No edema. No calf tenderness***.  Skin: Warm and dry.  Neurological:  Alert, awake, and appropriate.  Normal speech.  No acute focal neurological deficits are appreciated.  Psychiatric: Normal affect. Good eye contact. Appropriate in content.    ED Course    Procedures  ED Vital Signs:  Vitals:    05/28/18 1752   BP: (!) 224/116   Pulse: 92   Resp: 20   Temp: 97.6 °F (36.4 °C)   TempSrc: Oral   SpO2: 100%   Weight: 102.7 kg (226 lb 4.8 oz)   Height: 5' 4" (1.626 m)       Abnormal Lab Results:  Labs Reviewed - No data to display     All Lab Results:  ***    Imaging Results:  Imaging Results    None        The EKG was ordered, reviewed, and independently interpreted by the ED provider.  Interpretation time: 18:01  Rate: 82 BPM  Rhythm: normal sinus rhythm  Interpretation: Minimal voltage criteria for LVH. No STEMI.           The Emergency Provider reviewed the vital signs and test results, which are outlined above.    ED Discussion     ***    ED Medication(s):  Medications - No data to display    New Prescriptions    No medications on file             Medical Decision Making              Scribe Attestation:   Scribe #1: I performed the above scribed service and the documentation accurately describes the services I performed. I attest to the accuracy of the note.    Attending:   Physician Attestation Statement for Scribe #1: I, No att. providers found, personally performed the services described in this " documentation, as scribed by Dina Reid, in my presence, and it is both accurate and complete.          Clinical Impression       ICD-10-CM ICD-9-CM   1. Abdominal pain R10.9 789.00

## 2018-05-29 VITALS
OXYGEN SATURATION: 100 % | SYSTOLIC BLOOD PRESSURE: 159 MMHG | HEART RATE: 94 BPM | DIASTOLIC BLOOD PRESSURE: 76 MMHG | RESPIRATION RATE: 20 BRPM | WEIGHT: 226.31 LBS | HEIGHT: 64 IN | TEMPERATURE: 98 F | BODY MASS INDEX: 38.64 KG/M2

## 2018-05-29 LAB — TROPONIN I SERPL DL<=0.01 NG/ML-MCNC: <0.006 NG/ML

## 2018-05-29 RX ORDER — PROMETHAZINE HYDROCHLORIDE AND CODEINE PHOSPHATE 6.25; 1 MG/5ML; MG/5ML
5 SOLUTION ORAL EVERY 4 HOURS PRN
Qty: 118 ML | Refills: 0 | Status: SHIPPED | OUTPATIENT
Start: 2018-05-29 | End: 2018-06-08

## 2018-05-29 RX ORDER — METOPROLOL SUCCINATE 25 MG/1
25 TABLET, EXTENDED RELEASE ORAL DAILY
Qty: 30 TABLET | Refills: 11 | Status: SHIPPED | OUTPATIENT
Start: 2018-05-29 | End: 2019-02-18 | Stop reason: SDUPTHER

## 2018-12-24 ENCOUNTER — HOSPITAL ENCOUNTER (EMERGENCY)
Facility: HOSPITAL | Age: 53
Discharge: HOME | End: 2018-12-25
Attending: EMERGENCY MEDICINE
Payer: MEDICAID

## 2018-12-24 DIAGNOSIS — Z46.59 ENCOUNTER FOR NASOGASTRIC (NG) TUBE PLACEMENT: ICD-10-CM

## 2018-12-24 DIAGNOSIS — I16.0 HYPERTENSIVE URGENCY: Primary | ICD-10-CM

## 2018-12-24 DIAGNOSIS — R10.9 ABDOMINAL PAIN: ICD-10-CM

## 2018-12-24 DIAGNOSIS — R10.13 EPIGASTRIC PAIN: ICD-10-CM

## 2018-12-24 LAB
ALBUMIN SERPL BCP-MCNC: 4.3 G/DL
ALP SERPL-CCNC: 79 U/L
ALT SERPL W/O P-5'-P-CCNC: 15 U/L
ANION GAP SERPL CALC-SCNC: 14 MMOL/L
ANISOCYTOSIS BLD QL SMEAR: SLIGHT
AST SERPL-CCNC: 16 U/L
BASOPHILS # BLD AUTO: 0.02 K/UL
BASOPHILS NFR BLD: 0.2 %
BILIRUB SERPL-MCNC: 0.7 MG/DL
BUN SERPL-MCNC: 8 MG/DL
CALCIUM SERPL-MCNC: 10.1 MG/DL
CHLORIDE SERPL-SCNC: 103 MMOL/L
CO2 SERPL-SCNC: 25 MMOL/L
CREAT SERPL-MCNC: 1 MG/DL
DACRYOCYTES BLD QL SMEAR: ABNORMAL
DIFFERENTIAL METHOD: ABNORMAL
EOSINOPHIL # BLD AUTO: 0 K/UL
EOSINOPHIL NFR BLD: 0.2 %
ERYTHROCYTE [DISTWIDTH] IN BLOOD BY AUTOMATED COUNT: 16.1 %
EST. GFR  (AFRICAN AMERICAN): >60 ML/MIN/1.73 M^2
EST. GFR  (NON AFRICAN AMERICAN): >60 ML/MIN/1.73 M^2
GIANT PLATELETS BLD QL SMEAR: PRESENT
GLUCOSE SERPL-MCNC: 129 MG/DL
HCT VFR BLD AUTO: 48.1 %
HGB BLD-MCNC: 17.5 G/DL
HYPOCHROMIA BLD QL SMEAR: ABNORMAL
LIPASE SERPL-CCNC: 8 U/L
LYMPHOCYTES # BLD AUTO: 2.5 K/UL
LYMPHOCYTES NFR BLD: 27.7 %
MCH RBC QN AUTO: 28.8 PG
MCHC RBC AUTO-ENTMCNC: 36.4 G/DL
MCV RBC AUTO: 79 FL
MONOCYTES # BLD AUTO: 0.4 K/UL
MONOCYTES NFR BLD: 4.6 %
NEUTROPHILS # BLD AUTO: 6.2 K/UL
NEUTROPHILS NFR BLD: 67.7 %
OVALOCYTES BLD QL SMEAR: ABNORMAL
PLATELET # BLD AUTO: 223 K/UL
PLATELET BLD QL SMEAR: ABNORMAL
PMV BLD AUTO: ABNORMAL FL
POIKILOCYTOSIS BLD QL SMEAR: SLIGHT
POTASSIUM SERPL-SCNC: 3.3 MMOL/L
PROT SERPL-MCNC: 8.5 G/DL
RBC # BLD AUTO: 6.07 M/UL
SODIUM SERPL-SCNC: 142 MMOL/L
SPHEROCYTES BLD QL SMEAR: ABNORMAL
STOMATOCYTES BLD QL SMEAR: PRESENT
TARGETS BLD QL SMEAR: ABNORMAL
WBC # BLD AUTO: 9.18 K/UL

## 2018-12-24 PROCEDURE — 93010 ELECTROCARDIOGRAM REPORT: CPT | Mod: ,,, | Performed by: INTERNAL MEDICINE

## 2018-12-24 PROCEDURE — 96372 THER/PROPH/DIAG INJ SC/IM: CPT | Mod: 59

## 2018-12-24 PROCEDURE — 63600175 PHARM REV CODE 636 W HCPCS: Performed by: EMERGENCY MEDICINE

## 2018-12-24 PROCEDURE — 96374 THER/PROPH/DIAG INJ IV PUSH: CPT

## 2018-12-24 PROCEDURE — 96375 TX/PRO/DX INJ NEW DRUG ADDON: CPT

## 2018-12-24 PROCEDURE — 96376 TX/PRO/DX INJ SAME DRUG ADON: CPT

## 2018-12-24 PROCEDURE — 25500020 PHARM REV CODE 255: Performed by: EMERGENCY MEDICINE

## 2018-12-24 PROCEDURE — 36415 COLL VENOUS BLD VENIPUNCTURE: CPT

## 2018-12-24 PROCEDURE — 96361 HYDRATE IV INFUSION ADD-ON: CPT

## 2018-12-24 PROCEDURE — 63600175 PHARM REV CODE 636 W HCPCS: Performed by: FAMILY MEDICINE

## 2018-12-24 PROCEDURE — 43760 *HC REPLACEMENT GASTROS TUBE: CPT

## 2018-12-24 PROCEDURE — 25000003 PHARM REV CODE 250: Performed by: EMERGENCY MEDICINE

## 2018-12-24 PROCEDURE — 99291 CRITICAL CARE FIRST HOUR: CPT | Mod: 25

## 2018-12-24 PROCEDURE — 80053 COMPREHEN METABOLIC PANEL: CPT

## 2018-12-24 PROCEDURE — 85025 COMPLETE CBC W/AUTO DIFF WBC: CPT

## 2018-12-24 PROCEDURE — 83690 ASSAY OF LIPASE: CPT

## 2018-12-24 PROCEDURE — C9113 INJ PANTOPRAZOLE SODIUM, VIA: HCPCS | Performed by: EMERGENCY MEDICINE

## 2018-12-24 RX ORDER — PANTOPRAZOLE SODIUM 40 MG/10ML
40 INJECTION, POWDER, LYOPHILIZED, FOR SOLUTION INTRAVENOUS
Status: COMPLETED | OUTPATIENT
Start: 2018-12-24 | End: 2018-12-24

## 2018-12-24 RX ORDER — HYDROMORPHONE HYDROCHLORIDE 2 MG/ML
1 INJECTION, SOLUTION INTRAMUSCULAR; INTRAVENOUS; SUBCUTANEOUS
Status: COMPLETED | OUTPATIENT
Start: 2018-12-24 | End: 2018-12-24

## 2018-12-24 RX ORDER — PROMETHAZINE HYDROCHLORIDE 25 MG/ML
25 INJECTION, SOLUTION INTRAMUSCULAR; INTRAVENOUS
Status: COMPLETED | OUTPATIENT
Start: 2018-12-24 | End: 2018-12-24

## 2018-12-24 RX ORDER — MORPHINE SULFATE 10 MG/ML
4 INJECTION INTRAMUSCULAR; INTRAVENOUS; SUBCUTANEOUS
Status: COMPLETED | OUTPATIENT
Start: 2018-12-24 | End: 2018-12-24

## 2018-12-24 RX ORDER — ONDANSETRON 2 MG/ML
4 INJECTION INTRAMUSCULAR; INTRAVENOUS
Status: COMPLETED | OUTPATIENT
Start: 2018-12-24 | End: 2018-12-24

## 2018-12-24 RX ORDER — DEXTROMETHORPHAN/PSEUDOEPHED 2.5-7.5/.8
40 DROPS ORAL
Status: DISCONTINUED | OUTPATIENT
Start: 2018-12-24 | End: 2018-12-24 | Stop reason: RX

## 2018-12-24 RX ORDER — DICYCLOMINE HYDROCHLORIDE 10 MG/ML
20 INJECTION INTRAMUSCULAR
Status: COMPLETED | OUTPATIENT
Start: 2018-12-24 | End: 2018-12-24

## 2018-12-24 RX ORDER — HYDRALAZINE HYDROCHLORIDE 20 MG/ML
20 INJECTION INTRAMUSCULAR; INTRAVENOUS
Status: COMPLETED | OUTPATIENT
Start: 2018-12-24 | End: 2018-12-24

## 2018-12-24 RX ORDER — SIMETHICONE 80 MG
1 TABLET,CHEWABLE ORAL ONCE
Status: DISCONTINUED | OUTPATIENT
Start: 2018-12-24 | End: 2018-12-24

## 2018-12-24 RX ORDER — LABETALOL HYDROCHLORIDE 5 MG/ML
10 INJECTION, SOLUTION INTRAVENOUS
Status: DISCONTINUED | OUTPATIENT
Start: 2018-12-24 | End: 2018-12-24

## 2018-12-24 RX ORDER — HYDRALAZINE HYDROCHLORIDE 20 MG/ML
10 INJECTION INTRAMUSCULAR; INTRAVENOUS
Status: COMPLETED | OUTPATIENT
Start: 2018-12-24 | End: 2018-12-24

## 2018-12-24 RX ADMIN — HYDRALAZINE HYDROCHLORIDE 10 MG: 20 INJECTION INTRAMUSCULAR; INTRAVENOUS at 07:12

## 2018-12-24 RX ADMIN — SODIUM CHLORIDE 1000 ML: 0.9 INJECTION, SOLUTION INTRAVENOUS at 03:12

## 2018-12-24 RX ADMIN — DICYCLOMINE HYDROCHLORIDE 20 MG: 20 INJECTION, SOLUTION INTRAMUSCULAR at 03:12

## 2018-12-24 RX ADMIN — HYDRALAZINE HYDROCHLORIDE 20 MG: 20 INJECTION INTRAMUSCULAR; INTRAVENOUS at 08:12

## 2018-12-24 RX ADMIN — DICYCLOMINE HYDROCHLORIDE 20 MG: 20 INJECTION, SOLUTION INTRAMUSCULAR at 08:12

## 2018-12-24 RX ADMIN — ONDANSETRON 4 MG: 2 INJECTION, SOLUTION INTRAMUSCULAR; INTRAVENOUS at 09:12

## 2018-12-24 RX ADMIN — LIDOCAINE HYDROCHLORIDE 50 ML: 20 SOLUTION ORAL; TOPICAL at 04:12

## 2018-12-24 RX ADMIN — PROMETHAZINE HYDROCHLORIDE 25 MG: 25 INJECTION INTRAMUSCULAR; INTRAVENOUS at 03:12

## 2018-12-24 RX ADMIN — HYDROMORPHONE HYDROCHLORIDE 1 MG: 2 INJECTION INTRAMUSCULAR; INTRAVENOUS; SUBCUTANEOUS at 09:12

## 2018-12-24 RX ADMIN — IOHEXOL 75 ML: 350 INJECTION, SOLUTION INTRAVENOUS at 07:12

## 2018-12-24 RX ADMIN — PANTOPRAZOLE SODIUM 40 MG: 40 INJECTION, POWDER, FOR SOLUTION INTRAVENOUS at 06:12

## 2018-12-24 RX ADMIN — MORPHINE SULFATE 4 MG: 10 INJECTION, SOLUTION INTRAMUSCULAR; INTRAVENOUS at 05:12

## 2018-12-24 NOTE — ED PROVIDER NOTES
"SCRIBE #1 NOTE: I, Bobbi Mackey, am scribing for, and in the presence of, Jon Jimenez MD. I have scribed the HPI, ROS, and PEx.    SCRIBE #2 NOTE: I, Faith House, am scribing for, and in the presence of,  Larissa Garrido MD. I have scribed the remaining portions of the note not scribed by Scribe #1.      History     Chief Complaint   Patient presents with    Abdominal Pain     pt very diaphoretic and asking for help. thinks she may have food poisoining?     Review of patient's allergies indicates:  No Known Allergies      History of Present Illness     HPI    12/24/2018, 3:33 PM  History obtained from the patient      History of Present Illness: Molly Doe is a 53 y.o. female patient with a PMHx of HTN who presents to the Emergency Department for evaluation of epigastric abdominal pain which onset suddenly around 11 AM shortly after eating a [employer] sandwich. The pain is constant, moderate in severity, and is "irritating" her. No mitigating or exacerbating factors reported. Associated sxs include N/V x2. Patient denies fever, chills, CP, SOB, diarrhea, constipation, dysuria, hematuria, dizziness, lightheadedness, and all other sxs at this time..      Arrival mode: Personal vehicle    PCP: Primary Doctor No        Past Medical History:  Past Medical History:   Diagnosis Date    Hypertension        Past Surgical History:  Past Surgical History:   Procedure Laterality Date    TUBAL LIGATION           Family History:  Family History   Problem Relation Age of Onset    Kidney disease Mother     Heart disease Father     Cancer Sister     Multiple sclerosis Sister        Social History:  Social History     Tobacco Use    Smoking status: Current Every Day Smoker     Packs/day: 0.50    Smokeless tobacco: Never Used   Substance and Sexual Activity    Alcohol use: No    Drug use: No    Sexual activity: Unknown        Review of Systems     Review of Systems   Constitutional: Negative for chills and " fever.   HENT: Negative for sore throat.    Respiratory: Negative for shortness of breath.    Cardiovascular: Negative for chest pain.   Gastrointestinal: Positive for abdominal pain, nausea and vomiting. Negative for abdominal distention, anal bleeding, blood in stool, constipation and diarrhea.   Genitourinary: Negative for dysuria, flank pain and hematuria.   Musculoskeletal: Negative for back pain.   Skin: Negative for rash.   Neurological: Negative for dizziness, syncope, weakness and light-headedness.   Hematological: Does not bruise/bleed easily.   All other systems reviewed and are negative.     Physical Exam     Initial Vitals   BP Pulse Resp Temp SpO2   12/24/18 1528 12/24/18 1427 12/24/18 1427 12/24/18 1427 12/24/18 1427   (!) 241/111 90 15 98.8 °F (37.1 °C) 100 %      MAP       --                 Physical Exam  Nursing Notes and Vital Signs Reviewed.  Constitutional: Patient is in no acute distress. Well-developed and well-nourished.  Head: Atraumatic. Normocephalic.  Eyes: PERRL. EOM intact. Conjunctivae are not pale. No scleral icterus.  ENT: Mucous membranes are moist. Oropharynx is clear and symmetric.    Neck: Supple. Full ROM. No lymphadenopathy.  Cardiovascular: Regular rate. Regular rhythm. No murmurs, rubs, or gallops. Distal pulses are 2+ and symmetric.  Pulmonary/Chest: No respiratory distress. Clear to auscultation bilaterally. No wheezing or rales.  Abdominal: Soft and non-distended.  There is epigastric tenderness.  No rebound, guarding, or rigidity. Good bowel sounds.  Genitourinary: No CVA tenderness  Musculoskeletal: Moves all extremities. No obvious deformities. No edema. No calf tenderness.  Skin: Warm and dry.  Neurological:  Alert, awake, and appropriate.  Normal speech.  No acute focal neurological deficits are appreciated.  Psychiatric: Pt is restless. Good eye contact. Appropriate in content.     ED Course   Critical Care  Date/Time: 12/24/2018 8:20 PM  Performed by: Larissa  "MD Radhika  Authorized by: Larissa Garrido MD   Direct patient critical care time: 10 minutes  Additional history critical care time: 10 minutes  Ordering / reviewing critical care time: 10 minutes  Documentation critical care time: 10 minutes  Total critical care time (exclusive of procedural time) : 40 minutes  Critical care time was exclusive of separately billable procedures and treating other patients and teaching time.  Critical care was necessary to treat or prevent imminent or life-threatening deterioration of the following conditions: hypertensive urgency.  Critical care was time spent personally by me on the following activities: blood draw for specimens, development of treatment plan with patient or surrogate, discussions with consultants, interpretation of cardiac output measurements, evaluation of patient's response to treatment, examination of patient, obtaining history from patient or surrogate, ordering and performing treatments and interventions, ordering and review of laboratory studies, ordering and review of radiographic studies, pulse oximetry, re-evaluation of patient's condition and review of old charts.        ED Vital Signs:  Vitals:    12/24/18 1427 12/24/18 1528 12/24/18 1725 12/24/18 1810   BP:  (!) 241/111 (!) 230/111 (!) 243/107   Pulse: 90 89 80 83   Resp: 15 (!) 22 18 20   Temp: 98.8 °F (37.1 °C)      TempSrc: Oral      SpO2: 100% 100% 100% 100%   Weight: 101.7 kg (224 lb 3.3 oz)      Height: 5' 4" (1.626 m)       12/24/18 1843 12/24/18 1922 12/24/18 1924 12/24/18 1929   BP: (!) 220/100  (!) 237/105 (!) 237/105   Pulse:   76    Resp:  (!) 24     Temp:       TempSrc:       SpO2:   99%    Weight:       Height:        12/24/18 2002 12/24/18 2030 12/24/18 2031 12/24/18 2034   BP: (!) 234/101  (!) 224/108 (!) 221/101   Pulse: 84 101     Resp: (!) 21      Temp:       TempSrc:       SpO2: 99%      Weight:       Height:        12/24/18 2100 12/24/18 2103 12/24/18 2141   BP:  (!) 210/102 (!) " 166/74   Pulse:  106 (!) 118   Resp:  (!) 38 (!) 71   Temp:      TempSrc:      SpO2: 95%  100%   Weight:      Height:          Abnormal Lab Results:  Labs Reviewed   CBC W/ AUTO DIFFERENTIAL - Abnormal; Notable for the following components:       Result Value    RBC 6.07 (*)     Hemoglobin 17.5 (*)     MCV 79 (*)     MCHC 36.4 (*)     RDW 16.1 (*)     All other components within normal limits   COMPREHENSIVE METABOLIC PANEL - Abnormal; Notable for the following components:    Potassium 3.3 (*)     Glucose 129 (*)     Total Protein 8.5 (*)     All other components within normal limits   LIPASE        All Lab Results:  Results for orders placed or performed during the hospital encounter of 12/24/18   CBC auto differential   Result Value Ref Range    WBC 9.18 3.90 - 12.70 K/uL    RBC 6.07 (H) 4.00 - 5.40 M/uL    Hemoglobin 17.5 (H) 12.0 - 16.0 g/dL    Hematocrit 48.1 37.0 - 48.5 %    MCV 79 (L) 82 - 98 fL    MCH 28.8 27.0 - 31.0 pg    MCHC 36.4 (H) 32.0 - 36.0 g/dL    RDW 16.1 (H) 11.5 - 14.5 %    Platelets 223 150 - 350 K/uL    MPV SEE COMMENT 9.2 - 12.9 fL    Gran # (ANC) 6.2 1.8 - 7.7 K/uL    Lymph # 2.5 1.0 - 4.8 K/uL    Mono # 0.4 0.3 - 1.0 K/uL    Eos # 0.0 0.0 - 0.5 K/uL    Baso # 0.02 0.00 - 0.20 K/uL    Gran% 67.7 38.0 - 73.0 %    Lymph% 27.7 18.0 - 48.0 %    Mono% 4.6 4.0 - 15.0 %    Eosinophil% 0.2 0.0 - 8.0 %    Basophil% 0.2 0.0 - 1.9 %    Platelet Estimate Appears normal     Aniso Slight     Poik Slight     Hypo Occasional     Ovalocytes Occasional     Target Cells Occasional     Tear Drop Cells Occasional     Stomatocytes Present     Spherocytes Occasional     Large/Giant Platelets Present     Differential Method Automated    Comprehensive metabolic panel   Result Value Ref Range    Sodium 142 136 - 145 mmol/L    Potassium 3.3 (L) 3.5 - 5.1 mmol/L    Chloride 103 95 - 110 mmol/L    CO2 25 23 - 29 mmol/L    Glucose 129 (H) 70 - 110 mg/dL    BUN, Bld 8 6 - 20 mg/dL    Creatinine 1.0 0.5 - 1.4 mg/dL     Calcium 10.1 8.7 - 10.5 mg/dL    Total Protein 8.5 (H) 6.0 - 8.4 g/dL    Albumin 4.3 3.5 - 5.2 g/dL    Total Bilirubin 0.7 0.1 - 1.0 mg/dL    Alkaline Phosphatase 79 55 - 135 U/L    AST 16 10 - 40 U/L    ALT 15 10 - 44 U/L    Anion Gap 14 8 - 16 mmol/L    eGFR if African American >60 >60 mL/min/1.73 m^2    eGFR if non African American >60 >60 mL/min/1.73 m^2   Lipase   Result Value Ref Range    Lipase 8 4 - 60 U/L       Imaging Results:  Imaging Results          X-Ray Chest AP Portable (Final result)  Result time 12/24/18 21:59:24    Final result by Kenan Rios MD (12/24/18 21:59:24)                 Impression:      Interval placement of an esophagogastric tube, with the tip and side-port projecting over the body of the stomach, satisfactory.      Electronically signed by: Kenan Riso  Date:    12/24/2018  Time:    21:59             Narrative:    EXAMINATION:  XR CHEST AP PORTABLE    CLINICAL HISTORY:  . Encounter for fitting and adjustment of other gastrointestinal appliance and device    TECHNIQUE:  Single frontal portable view of the chest was performed.    COMPARISON:  CT abdomen pelvis 12/24/2018    FINDINGS:  Support devices: Interval placement of an esophagogastric tube, with the tip and side-port projecting over the body of the stomach, satisfactory.    The lungs are clear, with normal appearance of pulmonary vasculature and no pleural effusion or pneumothorax.    The cardiac silhouette is normal in size. The hilar and mediastinal contours are unremarkable.    Bones are intact.                               CT Abdomen Pelvis With Contrast (Final result)  Result time 12/24/18 20:15:12    Final result by Kenan Rios MD (12/24/18 20:15:12)                 Impression:      No acute abnormality. Stomach is moderately distended with fluid, nonspecific.      Electronically signed by: Kenan Rios  Date:    12/24/2018  Time:    20:15             Narrative:    EXAMINATION:  CT ABDOMEN PELVIS WITH  CONTRAST    CLINICAL HISTORY:  epigastric pain;    TECHNIQUE:  Low dose axial images, sagittal and coronal reformations were obtained from the lung bases to the pubic symphysis following the IV administration of 75 mL of Omnipaque 350 .  Oral contrast was not administered.    COMPARISON:  None.    FINDINGS:  ABDOMEN:    - Lung bases: No infiltrates and no nodules.    - Liver: No focal mass.    - Gallbladder: No calcified gallstones.    - Bile Ducts: No evidence of intra or extra hepatic biliary ductal dilation.    - Spleen: Negative.    - Kidneys: No mass or hydronephrosis.    - Adrenals: Unremarkable.    - Pancreas: No mass or peripancreatic fat stranding.    - Retroperitoneum:  No significant adenopathy.    - Vascular: No abdominal aortic aneurysm.  Aortoiliac atherosclerotic calcifications.    - Abdominal wall:  Unremarkable.    PELVIS:    No pelvic mass, adenopathy, or free fluid.  Fibroid uterus.    BOWEL/MESENTERY:    No evidence of bowel obstruction or inflammation.  Stomach is moderately distended with fluid.    BONES:  No acute osseous abnormality and no suspicious lytic or blastic lesion.  Moderate to severe lower lumbar facet arthropathy.  Trace anterolisthesis of L4 on L5.  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    All CT scans at this facility use dose modulation, iterative reconstruction and/or weight based dosing when appropriate to reduce radiation dose to as low as reasonably achievable.                               X-Ray Abdomen Flat And Erect (Final result)  Result time 12/24/18 16:04:30    Final result by Abundio York MD (Timothy) (12/24/18 16:04:30)                 Impression:      No free air.  No evidence of bowel obstruction.  Nonspecific distension of the stomach.      Electronically signed by: Abundio York MD  Date:    12/24/2018  Time:    16:04             Narrative:    EXAMINATION:  XR ABDOMEN FLAT AND ERECT    CLINICAL HISTORY:  Epigastric  "pain    COMPARISON:  None.    FINDINGS:  Two views of the abdomen. The bowel gas pattern is unremarkable.  No evidence of obstruction.  There is distention of the stomach which is nonspecific.  No free air.    There is scoliosis of the thoracolumbar spine.                                 The EKG was ordered, reviewed, and independently interpreted by the ED provider.  Interpretation time: 1436  Rate: 84 BPM  Rhythm: normal sinus rhythm  Interpretation: Normal ECG. No acute ST abnoramlity. No STEMI.         The Emergency Provider reviewed the vital signs and test results, which are outlined above.     ED Discussion     8:00 PM: Dr. Jimenez transfers care of pt to Dr. Garrido, pending imaging results.    8:31 PM  I evaluated patient at bedside.  She still complains of nausea and her blood pressure is elevated.  Will administer 20 mg hydralazine at this time.  Patient states that she has been trying to control her blood pressure "naturally" and not and has not been taking any of her medications for the past 2 months.  Denies any chest pain or short shortness of breath, headache.    10:07 PM: Reassessed pt at this time. 0.5L fluid removed by NG tube. Patient states she is feeling better at this time. Discussed with pt all pertinent ED information and results. Discussed pt dx and plan of tx. Gave pt all f/u and return to the ED instructions. All questions and concerns were addressed at this time. Pt expresses understanding of information and instructions, and is comfortable with plan to discharge. Pt is stable for discharge.    Regarding ABDOMINAL PAIN, I recommended that the patient: Sip water or other clear fluids; avoid solid food for the first few hours after vomiting or diarrhea; if vomiting, wait 6 hours, and then eat small amounts of mild foods such as rice, applesauce, or crackers; avoid dairy products; avoid citrus, high-fat foods, fried or greasy foods, tomato products, caffeine, alcohol, and carbonated beverages;  " avoid aspirin, ibuprofen or other anti-inflammatory medications, and narcotic pain medications unless prescribed.  In regards to prevention, I encouraged patient to:  Avoid fatty or greasy foods; drink plenty of water each day; eat small meals more frequently; exercise regularly; limit foods that produce gas; make sure meals are well-balanced and high in fiber and include plenty of fruits and vegetables.    Pre-hypertension/Hypertension: The pt has been informed that they may have pre-hypertension or hypertension based on a blood pressure reading in the ED. I recommend that the pt call the PCP listed on their discharge instructions or a physician of their choice this week to arrange f/u for further evaluation of possible pre-hypertension or hypertension.       I discussed with patient and/or family/caretaker that evaluation in the ED does not suggest any emergent or life threatening medical conditions requiring immediate intervention beyond what was provided in the ED, and I believe patient is safe for discharge.  Regardless, an unremarkable evaluation in the ED does not preclude the development or presence of a serious of life threatening condition. As such, patient was instructed to return immediately for any worsening or change in current symptoms.      ED Medication(s):  Medications   sodium chloride 0.9% bolus 1,000 mL (0 mLs Intravenous Stopped 12/24/18 2036)   promethazine injection 25 mg (25 mg Intramuscular Given 12/24/18 1536)   dicyclomine injection 20 mg (20 mg Intramuscular Given 12/24/18 1536)   GI cocktail (mylanta 30 mL, lidocaine 2 % viscous 10 mL, dicyclomine 10 mL) 50 mL (50 mLs Oral Given 12/24/18 1642)   morphine injection 4 mg (4 mg Intravenous Given 12/24/18 1738)   pantoprazole injection 40 mg (40 mg Intravenous Given 12/24/18 1807)   hydrALAZINE injection 10 mg (10 mg Intravenous Given 12/24/18 1929)   omnipaque 350 iohexol 75 mL (75 mLs Intravenous Given 12/24/18 1951)   hydrALAZINE  injection 20 mg (20 mg Intravenous Given 12/24/18 2034)   dicyclomine injection 20 mg (20 mg Intramuscular Given 12/24/18 2032)   hydromorphone (PF) injection 1 mg (1 mg Intravenous Given 12/24/18 2129)   ondansetron injection 4 mg (4 mg Intravenous Given 12/24/18 2143)     Current Discharge Medication List          Follow-up Information     [facility]. Schedule an appointment as soon as possible for a visit in 3 days.    Why:  As needed, If symptoms worsen  Contact information:  [address] [facility] [address]  [phone]                         Medical Decision Making:   Clinical Tests:   Lab Tests: Ordered and Reviewed  Radiological Study: Ordered and Reviewed  Medical Tests: Ordered and Reviewed             Scribe Attestation:   Scribe #1: I performed the above scribed service and the documentation accurately describes the services I performed. I attest to the accuracy of the note.     Attending:   Physician Attestation Statement for Scribe #1: I, Jon Jimenez MD, personally performed the services described in this documentation, as scribed by Bobbi Mackey, in my presence, and it is both accurate and complete.       Scribe Attestation:   Scribe #2: I performed the above scribed service and the documentation accurately describes the services I performed. I attest to the accuracy of the note.    Attending Attestation:           Physician Attestation for Scribe:    Physician Attestation Statement for Scribe #2: I, Larissa Garrido MD, reviewed documentation, as scribed by Faith House in my presence, and it is both accurate and complete. I also acknowledge and confirm the content of the note done by Scribe #1.           Clinical Impression       ICD-10-CM ICD-9-CM   1. Hypertensive urgency I16.0 401.9   2. Abdominal pain R10.9 789.00   3. Epigastric pain R10.13 789.06   4. Encounter for nasogastric (NG) tube placement Z46.59 V53.59       Disposition:   Disposition: Discharged  Condition:  Stable         Larissa Garrido MD  12/24/18 2024

## 2018-12-24 NOTE — ED NOTES
Pt wheeled herself into the emergency department via wheelchair, she is screaming and asking for pain medications and to see a doctor. ED at capacity at this time. Pt informed she must wait in the waiting room until she is called to see a provider. Escorted her back to lobby at this time.

## 2018-12-25 VITALS
HEART RATE: 104 BPM | HEIGHT: 64 IN | WEIGHT: 224.19 LBS | TEMPERATURE: 99 F | RESPIRATION RATE: 20 BRPM | SYSTOLIC BLOOD PRESSURE: 169 MMHG | BODY MASS INDEX: 38.27 KG/M2 | OXYGEN SATURATION: 100 % | DIASTOLIC BLOOD PRESSURE: 80 MMHG

## 2018-12-25 NOTE — ED NOTES
Pt resting in bed watching tv. 50 cc gastric content noted in suction canister. AAO x 3. Pain denied. VSS. Will continue to monitor.

## 2018-12-25 NOTE — ED NOTES
Pt resting in bed watching tv. NAD noted. AAO x 3. RR e/u, airway open and patent. Will continue to monitor.

## 2018-12-25 NOTE — ED NOTES
MD Garrido notified that pt has put out 1200cc gastric content via NG tube. MD Garrido states to continue with d/c.

## 2018-12-25 NOTE — ED NOTES
"Patient yelling at staff, saying "yall have to do something to help me. It's so irritating!" Patient informed that she has received Bentyl and Phenergan for nausea and abdominal pain relief. Patient states "I can't get no mo-phine??" Patient informed that she had no order for Morphine, and would need to allow time for the medications administered to take effect. Patient states, "Oh lord, these fluids ain't gonna help me at all".    Patient c/o abdominal pain that began today at 11:00 after eating subway. Patient describes pain as "irritating".    Patient moved to ED room 13, patient assisted onto stretcher and changed into a gown. Patient placed on cardiac monitor, continuous pulse oximetry and automatic blood pressure cuff. Bed placed in low locked position, side rails up x 2, call light is within reach of patient or family, orientation to room and explanation of wait provided to family and patient, alarms set and turned on for monitor and pulse ox, awaiting MD evaluation and orders, will continue to monitor.    Patient identifies self as Molly Doe      LOC: The patient is awake, alert and aware of environment with an appropriate affect, the patient is oriented x 3 and speaking appropriately.  APPEARANCE: Patient resting comfortably and in no acute distress, patient is clean and well groomed, patient's clothing is properly fastened.  SKIN: The skin is warm and dry, color consistent with ethnicity, patient has normal skin turgor and moist mucus membranes, skin intact, no breakdown or bruising noted.  MUSCULOSKELETAL: Patient moving all extremities well, no obvious swelling or deformities noted.  RESPIRATORY: Airway is open and patent, respirations are spontaneous, patient has a normal effort and rate, no accessory muscle use noted.  CARDIAC: Patient has a normal rate and rhythm, no periphreal edema noted, capillary refill < 3 seconds.  ABDOMEN: Soft, round, and generalized tenderness to " palpation.

## 2018-12-25 NOTE — ED NOTES
"New IV started. Pt updated on POC. Pt informed of HTN. Pt states that she has been treating her HTN "naturally" for 2 months by diet and exercise. Denies taking home BP medicine. Pt reports sudden abd pain starting today after eating subway. Pt states that this is the second episode this month. Pt repeatedly requesting morphine. MD Jimenez notified. MD has informed pt that she needs to go to CT first. Pt verbalizes understanding.  "

## 2018-12-25 NOTE — ED NOTES
Pt educated that she is unable to leave till 0130 due to dilaudid administration. Pt requesting to go to car to retrieve cell phone to call ride. Charge nurse Sosa notified. Pt escorted by wheelchair to car w/ tech and  to retrieve cell phone.

## 2018-12-25 NOTE — ED NOTES
"MD Garrido at bedside. Pt repeatedly requesting for morphine. Pt states "I don't care about my blood pressure! Give me morphine!" MD aware.  "

## 2018-12-25 NOTE — ED NOTES
MD Garrido notified of BP. MD states to continue with d/c. Pt has waited 4 hrs post dilaudid administration. AAO x 3. Gait steady. NAD noted. RR e/u, airway open and patent. Will continue with d/c.

## 2018-12-25 NOTE — ED NOTES
Pt returned from CT. April, CT, states that she was unable to perform CT due to infiltration of IV.

## 2019-02-17 ENCOUNTER — HOSPITAL ENCOUNTER (EMERGENCY)
Facility: HOSPITAL | Age: 54
Discharge: HOME | End: 2019-02-18
Attending: EMERGENCY MEDICINE
Payer: MEDICAID

## 2019-02-17 DIAGNOSIS — R10.84 GENERALIZED ABDOMINAL PAIN: Primary | ICD-10-CM

## 2019-02-17 DIAGNOSIS — I10 HYPERTENSION: ICD-10-CM

## 2019-02-17 PROCEDURE — 96375 TX/PRO/DX INJ NEW DRUG ADDON: CPT

## 2019-02-17 PROCEDURE — 99284 EMERGENCY DEPT VISIT MOD MDM: CPT | Mod: 25

## 2019-02-17 PROCEDURE — 96374 THER/PROPH/DIAG INJ IV PUSH: CPT

## 2019-02-17 PROCEDURE — 96361 HYDRATE IV INFUSION ADD-ON: CPT

## 2019-02-17 RX ORDER — HYDROMORPHONE HYDROCHLORIDE 2 MG/ML
1 INJECTION, SOLUTION INTRAMUSCULAR; INTRAVENOUS; SUBCUTANEOUS
Status: COMPLETED | OUTPATIENT
Start: 2019-02-18 | End: 2019-02-18

## 2019-02-17 RX ORDER — KETOROLAC TROMETHAMINE 30 MG/ML
15 INJECTION, SOLUTION INTRAMUSCULAR; INTRAVENOUS
Status: COMPLETED | OUTPATIENT
Start: 2019-02-18 | End: 2019-02-18

## 2019-02-17 RX ORDER — HYDRALAZINE HYDROCHLORIDE 20 MG/ML
20 INJECTION INTRAMUSCULAR; INTRAVENOUS
Status: DISCONTINUED | OUTPATIENT
Start: 2019-02-18 | End: 2019-02-18 | Stop reason: HOSPADM

## 2019-02-17 RX ORDER — ONDANSETRON 2 MG/ML
4 INJECTION INTRAMUSCULAR; INTRAVENOUS
Status: COMPLETED | OUTPATIENT
Start: 2019-02-18 | End: 2019-02-18

## 2019-02-18 VITALS
OXYGEN SATURATION: 94 % | SYSTOLIC BLOOD PRESSURE: 179 MMHG | RESPIRATION RATE: 29 BRPM | TEMPERATURE: 98 F | BODY MASS INDEX: 36.19 KG/M2 | HEIGHT: 66 IN | DIASTOLIC BLOOD PRESSURE: 102 MMHG | HEART RATE: 76 BPM

## 2019-02-18 LAB
ALBUMIN SERPL BCP-MCNC: 4.2 G/DL
ALP SERPL-CCNC: 89 U/L
ALT SERPL W/O P-5'-P-CCNC: 18 U/L
ANION GAP SERPL CALC-SCNC: 15 MMOL/L
AST SERPL-CCNC: 26 U/L
BASOPHILS # BLD AUTO: 0 K/UL
BASOPHILS NFR BLD: 0 %
BILIRUB SERPL-MCNC: 0.7 MG/DL
BUN SERPL-MCNC: 7 MG/DL
CALCIUM SERPL-MCNC: 10.1 MG/DL
CHLORIDE SERPL-SCNC: 96 MMOL/L
CO2 SERPL-SCNC: 23 MMOL/L
CREAT SERPL-MCNC: 0.9 MG/DL
DACRYOCYTES BLD QL SMEAR: ABNORMAL
DIFFERENTIAL METHOD: ABNORMAL
EOSINOPHIL # BLD AUTO: 0 K/UL
EOSINOPHIL NFR BLD: 0 %
ERYTHROCYTE [DISTWIDTH] IN BLOOD BY AUTOMATED COUNT: 15.8 %
EST. GFR  (AFRICAN AMERICAN): >60 ML/MIN/1.73 M^2
EST. GFR  (NON AFRICAN AMERICAN): >60 ML/MIN/1.73 M^2
GIANT PLATELETS BLD QL SMEAR: PRESENT
GLUCOSE SERPL-MCNC: 123 MG/DL
HCT VFR BLD AUTO: 45 %
HCV AB SERPL QL IA: NEGATIVE
HGB BLD-MCNC: 17.2 G/DL
HIV 1+2 AB+HIV1 P24 AG SERPL QL IA: NEGATIVE
HYPOCHROMIA BLD QL SMEAR: ABNORMAL
LIPASE SERPL-CCNC: 6 U/L
LYMPHOCYTES # BLD AUTO: 2.4 K/UL
LYMPHOCYTES NFR BLD: 20.9 %
MCH RBC QN AUTO: 29.6 PG
MCHC RBC AUTO-ENTMCNC: 38.2 G/DL
MCV RBC AUTO: 77 FL
MONOCYTES # BLD AUTO: 1.5 K/UL
MONOCYTES NFR BLD: 12.6 %
NEUTROPHILS # BLD AUTO: 7.7 K/UL
NEUTROPHILS NFR BLD: 66.8 %
PLATELET # BLD AUTO: 169 K/UL
PLATELET BLD QL SMEAR: ABNORMAL
PMV BLD AUTO: ABNORMAL FL
POIKILOCYTOSIS BLD QL SMEAR: SLIGHT
POTASSIUM SERPL-SCNC: 3.5 MMOL/L
PROT SERPL-MCNC: 8.8 G/DL
RBC # BLD AUTO: 5.82 M/UL
SODIUM SERPL-SCNC: 134 MMOL/L
STOMATOCYTES BLD QL SMEAR: PRESENT
TARGETS BLD QL SMEAR: ABNORMAL
WBC # BLD AUTO: 11.58 K/UL

## 2019-02-18 PROCEDURE — 63600175 PHARM REV CODE 636 W HCPCS: Performed by: EMERGENCY MEDICINE

## 2019-02-18 PROCEDURE — 86703 HIV-1/HIV-2 1 RESULT ANTBDY: CPT

## 2019-02-18 PROCEDURE — 86803 HEPATITIS C AB TEST: CPT

## 2019-02-18 PROCEDURE — 85025 COMPLETE CBC W/AUTO DIFF WBC: CPT

## 2019-02-18 PROCEDURE — 25000003 PHARM REV CODE 250: Performed by: EMERGENCY MEDICINE

## 2019-02-18 PROCEDURE — 83690 ASSAY OF LIPASE: CPT

## 2019-02-18 PROCEDURE — 80053 COMPREHEN METABOLIC PANEL: CPT

## 2019-02-18 RX ORDER — HYDROCODONE BITARTRATE AND ACETAMINOPHEN 5; 325 MG/1; MG/1
1 TABLET ORAL EVERY 4 HOURS PRN
Qty: 18 TABLET | Refills: 0 | Status: SHIPPED | OUTPATIENT
Start: 2019-02-18

## 2019-02-18 RX ORDER — METOPROLOL SUCCINATE 25 MG/1
25 TABLET, EXTENDED RELEASE ORAL DAILY
Qty: 30 TABLET | Refills: 11 | Status: SHIPPED | OUTPATIENT
Start: 2019-02-18 | End: 2020-02-18

## 2019-02-18 RX ORDER — AMLODIPINE BESYLATE 10 MG/1
10 TABLET ORAL DAILY
Qty: 30 TABLET | Refills: 5 | Status: SHIPPED | OUTPATIENT
Start: 2019-02-18 | End: 2020-02-18

## 2019-02-18 RX ORDER — DICYCLOMINE HYDROCHLORIDE 20 MG/1
20 TABLET ORAL 2 TIMES DAILY
Qty: 20 TABLET | Refills: 0 | Status: SHIPPED | OUTPATIENT
Start: 2019-02-18 | End: 2019-03-20

## 2019-02-18 RX ORDER — HYDROCODONE BITARTRATE AND ACETAMINOPHEN 5; 325 MG/1; MG/1
1 TABLET ORAL
Status: COMPLETED | OUTPATIENT
Start: 2019-02-18 | End: 2019-02-18

## 2019-02-18 RX ORDER — CLONIDINE HYDROCHLORIDE 0.2 MG/1
0.2 TABLET ORAL
Status: COMPLETED | OUTPATIENT
Start: 2019-02-18 | End: 2019-02-18

## 2019-02-18 RX ADMIN — KETOROLAC TROMETHAMINE 15 MG: 30 INJECTION, SOLUTION INTRAMUSCULAR; INTRAVENOUS at 12:02

## 2019-02-18 RX ADMIN — HYDROCODONE BITARTRATE AND ACETAMINOPHEN 1 TABLET: 5; 325 TABLET ORAL at 01:02

## 2019-02-18 RX ADMIN — ONDANSETRON 4 MG: 2 INJECTION INTRAMUSCULAR; INTRAVENOUS at 12:02

## 2019-02-18 RX ADMIN — HYDROMORPHONE HYDROCHLORIDE 1 MG: 2 INJECTION, SOLUTION INTRAMUSCULAR; INTRAVENOUS; SUBCUTANEOUS at 12:02

## 2019-02-18 RX ADMIN — SODIUM CHLORIDE 1000 ML: 0.9 INJECTION, SOLUTION INTRAVENOUS at 12:02

## 2019-02-18 RX ADMIN — CLONIDINE HYDROCHLORIDE 0.2 MG: 0.2 TABLET ORAL at 01:02

## 2019-02-18 NOTE — DISCHARGE INSTRUCTIONS
You need to followup with Gastroenterology for your abdominal pain.  Also, make sure to take your blood pressure medication and your have refills too.

## 2019-02-18 NOTE — ED PROVIDER NOTES
"SCRIBE #1 NOTE: I, Jammie Mascorro, am scribing for, and in the presence of, Maynor Maria Do, MD. I have scribed the entire note.      History      Chief Complaint   Patient presents with    Abdominal Pain     pt complaining of upper abdominal pain that started sat       Review of patient's allergies indicates:  No Known Allergies     HPI   HPI    2/18/2019, 12:00 AM   History obtained from the patient      History of Present Illness: Molly Doe is a 53 y.o. female patient with PMHx of HTN with PMHx of HTN who presents to the Emergency Department for upper abd pain which onset gradually 2 days ago. Patient states sxs onset after eating a burger. Symptoms are constant and moderate in severity. No mitigating or exacerbating factors reported. Associated sxs include chills and N/V. Pt mentions she has not passed a bowel movement in 3 days. Patient denies any fever, hematochezia, dysuria, hematuria, urinary frequency, diarrhea, and all other sxs at this time. No prior Tx. Pt notes that she has an appointment with her PCP on 02/28/19 for referral to GI. Patient reports hx of HTN, but states she is not taking medication as she is treating her BP "naturally". No further complaints or concerns at this time.     Arrival mode: Personal vehicle    PCP: Primary Doctor No       Past Medical History:  Past Medical History:   Diagnosis Date    Hypertension        Past Surgical History:  Past Surgical History:   Procedure Laterality Date    TUBAL LIGATION           Family History:  Family History   Problem Relation Age of Onset    Kidney disease Mother     Heart disease Father     Cancer Sister     Multiple sclerosis Sister        Social History:  Social History     Tobacco Use    Smoking status: Current Every Day Smoker     Packs/day: 0.50    Smokeless tobacco: Never Used   Substance and Sexual Activity    Alcohol use: No    Drug use: No    Sexual activity: Unknown       ROS   Review of Systems "   Constitutional: Positive for chills. Negative for fever.   HENT: Negative for sore throat.    Respiratory: Negative for shortness of breath.    Cardiovascular: Negative for chest pain.   Gastrointestinal: Positive for abdominal pain (upper), nausea and vomiting. Negative for blood in stool and diarrhea.   Genitourinary: Negative for dysuria, frequency and hematuria.   Musculoskeletal: Negative for back pain.   Skin: Negative for rash.   Neurological: Negative for weakness.   Hematological: Does not bruise/bleed easily.       Physical Exam      Initial Vitals [02/17/19 2308]   BP Pulse Resp Temp SpO2   (!) 230/111 81 20 98.1 °F (36.7 °C) 98 %      MAP       --          Physical Exam  Nursing Notes and Vital Signs Reviewed.  Constitutional: Patient is in no acute distress. Well-developed and well-nourished.  Head: Atraumatic. Normocephalic.  Eyes: PERRL. EOM intact. Conjunctivae are not pale. No scleral icterus.  ENT: Mucous membranes are moist. Oropharynx is clear and symmetric.    Neck: Supple. Full ROM. No lymphadenopathy.  Cardiovascular: Regular rate. Regular rhythm. No murmurs, rubs, or gallops. Distal pulses are 2+ and symmetric.  Pulmonary/Chest: No respiratory distress. Clear to auscultation bilaterally. No wheezing or rales.  Abdominal: Soft and non-distended.  There is no tenderness.  No rebound, guarding, or rigidity. Good bowel sounds.  Genitourinary: No CVA tenderness  Musculoskeletal: Moves all extremities. No obvious deformities. No edema. No calf tenderness.  Skin: Warm and dry.  Neurological:  Alert, awake, and appropriate.  Normal speech.  No acute focal neurological deficits are appreciated.  Psychiatric: Normal affect. Good eye contact. Appropriate in content.    ED Course    Procedures  ED Vital Signs:  Vitals:    02/17/19 2308 02/18/19 0046 02/18/19 0049   BP: (!) 230/111 (!) 158/90 (!) 163/89   Pulse: 81 82 80   Resp: 20 20 20   Temp: 98.1 °F (36.7 °C)     TempSrc: Oral     SpO2: 98% 96% (!)  "94%   Height: 5' 6" (1.676 m)         Abnormal Lab Results:  Labs Reviewed   COMPREHENSIVE METABOLIC PANEL - Abnormal; Notable for the following components:       Result Value    Sodium 134 (*)     Glucose 123 (*)     Total Protein 8.8 (*)     All other components within normal limits   CBC W/ AUTO DIFFERENTIAL - Abnormal; Notable for the following components:    RBC 5.82 (*)     Hemoglobin 17.2 (*)     MCV 77 (*)     MCHC 38.2 (*)     RDW 15.8 (*)     All other components within normal limits   LIPASE   HIV 1 / 2 ANTIBODY   HEPATITIS C ANTIBODY   URINALYSIS, REFLEX TO URINE CULTURE        All Lab Results:  Results for orders placed or performed during the hospital encounter of 02/17/19   Comp. Metabolic Panel   Result Value Ref Range    Sodium 134 (L) 136 - 145 mmol/L    Potassium 3.5 3.5 - 5.1 mmol/L    Chloride 96 95 - 110 mmol/L    CO2 23 23 - 29 mmol/L    Glucose 123 (H) 70 - 110 mg/dL    BUN, Bld 7 6 - 20 mg/dL    Creatinine 0.9 0.5 - 1.4 mg/dL    Calcium 10.1 8.7 - 10.5 mg/dL    Total Protein 8.8 (H) 6.0 - 8.4 g/dL    Albumin 4.2 3.5 - 5.2 g/dL    Total Bilirubin 0.7 0.1 - 1.0 mg/dL    Alkaline Phosphatase 89 55 - 135 U/L    AST 26 10 - 40 U/L    ALT 18 10 - 44 U/L    Anion Gap 15 8 - 16 mmol/L    eGFR if African American >60 >60 mL/min/1.73 m^2    eGFR if non African American >60 >60 mL/min/1.73 m^2   Lipase   Result Value Ref Range    Lipase 6 4 - 60 U/L   CBC auto differential   Result Value Ref Range    WBC 11.58 3.90 - 12.70 K/uL    RBC 5.82 (H) 4.00 - 5.40 M/uL    Hemoglobin 17.2 (H) 12.0 - 16.0 g/dL    Hematocrit 45.0 37.0 - 48.5 %    MCV 77 (L) 82 - 98 fL    MCH 29.6 27.0 - 31.0 pg    MCHC 38.2 (H) 32.0 - 36.0 g/dL    RDW 15.8 (H) 11.5 - 14.5 %    Platelets 169 150 - 350 K/uL    MPV SEE COMMENT 9.2 - 12.9 fL       Imaging Results:  Imaging Results    None             The EKG was ordered, reviewed, and independently interpreted by the ED provider.  Interpretation time: 23:40  Rate: 75 BPM  Rhythm: " normal sinus rhythm with sinus arrhythmia  Interpretation: Moderate voltage criteria for LVH. Borderline ECG. No STEMI.        The Emergency Provider reviewed the vital signs and test results, which are outlined above.    ED Discussion     1:00 AM: Reassessed pt at this time.  Pt states her condition has improved at this time. On re-exam, abdomen is soft and nontender. Discussed with pt all pertinent ED information and results. Discussed pt dx and plan of tx. Gave pt all f/u and return to the ED instructions. All questions and concerns were addressed at this time. Pt expresses understanding of information and instructions, and is comfortable with plan to discharge. Pt is stable for discharge.  Instructed patient to f/u with GI. Will restart patient on Toprol and Amlodipine.     I discussed with patient and/or family/caretaker that evaluation in the ED does not suggest any emergent or life threatening medical conditions requiring immediate intervention beyond what was provided in the ED, and I believe patient is safe for discharge.  Regardless, an unremarkable evaluation in the ED does not preclude the development or presence of a serious of life threatening condition. As such, patient was instructed to return immediately for any worsening or change in current symptoms.        ED Medication(s):  Medications   hydrALAZINE injection 20 mg (0 mg Intravenous Hold 2/18/19 0000)   sodium chloride 0.9% bolus 1,000 mL (1,000 mLs Intravenous New Bag 2/18/19 0035)   ondansetron injection 4 mg (4 mg Intravenous Given 2/18/19 0036)   hydromorphone (PF) injection 1 mg (1 mg Intravenous Given 2/18/19 0037)   ketorolac injection 15 mg (15 mg Intravenous Given 2/18/19 0000)       Follow-up Information     Primary Doctor No In 2 days.                   Medical Decision Making    Medical Decision Making:   Clinical Tests:   Lab Tests: Ordered and Reviewed  Medical Tests: Ordered and Reviewed           Scribe Attestation:   Scribe #1:  I performed the above scribed service and the documentation accurately describes the services I performed. I attest to the accuracy of the note.    Attending:   Physician Attestation Statement for Scribe #1: I, Maynor Maria Do, MD, personally performed the services described in this documentation, as scribed by Jammie Mascorro, in my presence, and it is both accurate and complete.          Clinical Impression       ICD-10-CM ICD-9-CM   1. Generalized abdominal pain R10.84 789.07   2. Hypertension I10 401.9       Disposition:   Disposition: Discharged  Condition: Stable          Maynor Maria Do, MD  02/18/19 0104

## 2021-09-01 ENCOUNTER — HOSPITAL ENCOUNTER (EMERGENCY)
Facility: HOSPITAL | Age: 56
Discharge: HOME | End: 2021-09-01
Attending: EMERGENCY MEDICINE

## 2021-09-01 VITALS
SYSTOLIC BLOOD PRESSURE: 186 MMHG | DIASTOLIC BLOOD PRESSURE: 99 MMHG | HEART RATE: 100 BPM | RESPIRATION RATE: 19 BRPM | BODY MASS INDEX: 37.56 KG/M2 | OXYGEN SATURATION: 99 % | WEIGHT: 220 LBS | TEMPERATURE: 99 F | HEIGHT: 64 IN

## 2021-09-01 DIAGNOSIS — I10 HYPERTENSION, UNSPECIFIED TYPE: ICD-10-CM

## 2021-09-01 DIAGNOSIS — M54.32 SCIATICA OF LEFT SIDE: Primary | ICD-10-CM

## 2021-09-01 PROCEDURE — 99284 EMERGENCY DEPT VISIT MOD MDM: CPT | Mod: 25

## 2021-09-01 PROCEDURE — 99285 PR EMERGENCY DEPT VISIT,LEVEL V: ICD-10-PCS | Mod: ,,, | Performed by: EMERGENCY MEDICINE

## 2021-09-01 PROCEDURE — 99285 EMERGENCY DEPT VISIT HI MDM: CPT | Mod: ,,, | Performed by: EMERGENCY MEDICINE

## 2021-09-01 PROCEDURE — 96372 THER/PROPH/DIAG INJ SC/IM: CPT

## 2021-09-01 PROCEDURE — 63600175 PHARM REV CODE 636 W HCPCS: Performed by: EMERGENCY MEDICINE

## 2021-09-01 RX ORDER — KETOROLAC TROMETHAMINE 30 MG/ML
10 INJECTION, SOLUTION INTRAMUSCULAR; INTRAVENOUS
Status: COMPLETED | OUTPATIENT
Start: 2021-09-01 | End: 2021-09-01

## 2021-09-01 RX ORDER — KETOROLAC TROMETHAMINE 30 MG/ML
10 INJECTION, SOLUTION INTRAMUSCULAR; INTRAVENOUS
Status: DISCONTINUED | OUTPATIENT
Start: 2021-09-01 | End: 2021-09-01

## 2021-09-01 RX ORDER — GABAPENTIN 400 MG/1
400 CAPSULE ORAL 2 TIMES DAILY
Qty: 60 CAPSULE | Refills: 0 | Status: SHIPPED | OUTPATIENT
Start: 2021-09-01 | End: 2021-10-01

## 2021-09-01 RX ORDER — AMLODIPINE BESYLATE 10 MG/1
10 TABLET ORAL DAILY
Qty: 30 TABLET | Refills: 0 | Status: SHIPPED | OUTPATIENT
Start: 2021-09-01 | End: 2022-09-01

## 2021-09-01 RX ADMIN — KETOROLAC TROMETHAMINE 10 MG: 30 INJECTION, SOLUTION INTRAMUSCULAR; INTRAVENOUS at 08:09
